# Patient Record
Sex: MALE | Race: WHITE | NOT HISPANIC OR LATINO | Employment: UNEMPLOYED | URBAN - NONMETROPOLITAN AREA
[De-identification: names, ages, dates, MRNs, and addresses within clinical notes are randomized per-mention and may not be internally consistent; named-entity substitution may affect disease eponyms.]

---

## 2017-01-03 RX ORDER — AZITHROMYCIN 500 MG/1
1000 TABLET, FILM COATED ORAL DAILY
Qty: 2 TABLET | Refills: 0 | Status: SHIPPED | OUTPATIENT
Start: 2017-01-03 | End: 2017-02-20

## 2017-01-03 NOTE — TELEPHONE ENCOUNTER
Pt called and stated that insurance would not cover doxy and a new order for zithromax 1000g.  Sent to pharmacy of choice

## 2017-02-20 ENCOUNTER — OFFICE VISIT (OUTPATIENT)
Dept: FAMILY MEDICINE CLINIC | Facility: CLINIC | Age: 27
End: 2017-02-20

## 2017-02-20 VITALS
WEIGHT: 162.8 LBS | BODY MASS INDEX: 21.57 KG/M2 | DIASTOLIC BLOOD PRESSURE: 60 MMHG | OXYGEN SATURATION: 98 % | HEART RATE: 83 BPM | HEIGHT: 73 IN | SYSTOLIC BLOOD PRESSURE: 118 MMHG

## 2017-02-20 DIAGNOSIS — Z13.9 SCREENING: ICD-10-CM

## 2017-02-20 DIAGNOSIS — A74.9 CHLAMYDIA: Primary | ICD-10-CM

## 2017-02-20 PROCEDURE — 99213 OFFICE O/P EST LOW 20 MIN: CPT | Performed by: INTERNAL MEDICINE

## 2017-02-20 RX ORDER — AZITHROMYCIN 250 MG/1
TABLET, FILM COATED ORAL
Qty: 6 TABLET | Refills: 0 | Status: SHIPPED | OUTPATIENT
Start: 2017-02-20 | End: 2017-08-01

## 2017-02-20 NOTE — PROGRESS NOTES
Subjective   Kris Lee is a 26 y.o. male.   Chief Complaint   Patient presents with   • Follow-up     having flare up       History of Present Illness co urinary discharge. Culture grew chylamadia last time. No fever.     The following portions of the patient's history were reviewed and updated as appropriate: allergies, current medications, past family history, past medical history, past social history, past surgical history and problem list.    Review of Systems   Constitutional: Negative for activity change, appetite change, fatigue and unexpected weight change.   HENT: Negative for ear pain, facial swelling and hearing loss.    Respiratory: Negative for cough, chest tightness, shortness of breath and wheezing.    Cardiovascular: Negative for chest pain, palpitations and leg swelling.   Gastrointestinal: Negative for abdominal pain.   Genitourinary: Positive for dysuria. Negative for difficulty urinating, flank pain, frequency and urgency.   Musculoskeletal: Negative for arthralgias and back pain.   Skin: Negative for color change and rash.   Allergic/Immunologic: Negative for environmental allergies and food allergies.   Neurological: Negative for dizziness, syncope, weakness, numbness and headaches.   Hematological: Negative for adenopathy.   Psychiatric/Behavioral: Negative for confusion, decreased concentration, dysphoric mood, sleep disturbance and suicidal ideas. The patient is not nervous/anxious.    All other systems reviewed and are negative.      Objective   Physical Exam   Constitutional: He is oriented to person, place, and time. Vital signs are normal. He appears well-developed and well-nourished.   HENT:   Head: Normocephalic.   Neck: No thyromegaly present.   Cardiovascular: Normal rate, regular rhythm and normal heart sounds.    No murmur heard.  Pulmonary/Chest: Effort normal and breath sounds normal. He has no wheezes. He has no rales.   Genitourinary: Discharge found.    Musculoskeletal: He exhibits no edema.   Lymphadenopathy:     He has no cervical adenopathy.   Neurological: He is alert and oriented to person, place, and time. No cranial nerve deficit.   Skin: Skin is warm and dry.   Psychiatric: He has a normal mood and affect. His behavior is normal. Judgment and thought content normal. His mood appears not anxious. He does not exhibit a depressed mood. He expresses no homicidal and no suicidal ideation.   Nursing note and vitals reviewed.      Assessment/Plan   Kris was seen today for follow-up.    Diagnoses and all orders for this visit:    Chlamydia      Azithromycin 500mg day 1, then 250mg q day days 2-5. Cbc,cmp, lipids, tfts. rtc in 1 week if not clear.

## 2017-02-24 ENCOUNTER — LAB (OUTPATIENT)
Dept: LAB | Facility: OTHER | Age: 27
End: 2017-02-24

## 2017-02-24 DIAGNOSIS — Z13.9 SCREENING: ICD-10-CM

## 2017-02-24 LAB
ALBUMIN SERPL-MCNC: 5 G/DL (ref 3.2–5.5)
ALBUMIN/GLOB SERPL: 1.7 G/DL (ref 1–3)
ALP SERPL-CCNC: 71 U/L (ref 15–121)
ALT SERPL W P-5'-P-CCNC: 16 U/L (ref 10–60)
ANION GAP SERPL CALCULATED.3IONS-SCNC: 8 MMOL/L (ref 5–15)
AST SERPL-CCNC: 26 U/L (ref 10–60)
BASOPHILS # BLD AUTO: 0.01 10*3/MM3 (ref 0–0.2)
BASOPHILS NFR BLD AUTO: 0.2 % (ref 0–2)
BILIRUB SERPL-MCNC: 1.2 MG/DL (ref 0.2–1)
BUN BLD-MCNC: 18 MG/DL (ref 8–25)
BUN/CREAT SERPL: 16.4 (ref 7–25)
CALCIUM SPEC-SCNC: 10.2 MG/DL (ref 8.4–10.8)
CHLORIDE SERPL-SCNC: 101 MMOL/L (ref 100–112)
CHOLEST SERPL-MCNC: 186 MG/DL (ref 150–200)
CO2 SERPL-SCNC: 30 MMOL/L (ref 20–32)
CREAT BLD-MCNC: 1.1 MG/DL (ref 0.4–1.3)
DEPRECATED RDW RBC AUTO: 41.3 FL (ref 35.1–43.9)
EOSINOPHIL # BLD AUTO: 0.08 10*3/MM3 (ref 0–0.7)
EOSINOPHIL NFR BLD AUTO: 1.7 % (ref 0–7)
ERYTHROCYTE [DISTWIDTH] IN BLOOD BY AUTOMATED COUNT: 12.2 % (ref 11.5–14.5)
GFR SERPL CREATININE-BSD FRML MDRD: 82 ML/MIN/1.73 (ref 77–179)
GLOBULIN UR ELPH-MCNC: 3 GM/DL (ref 2.5–4.6)
GLUCOSE BLD-MCNC: 110 MG/DL (ref 70–100)
HCT VFR BLD AUTO: 45.1 % (ref 39–49)
HDLC SERPL-MCNC: 40 MG/DL (ref 35–100)
HGB BLD-MCNC: 15.1 G/DL (ref 13.7–17.3)
LDLC SERPL CALC-MCNC: 129 MG/DL
LDLC/HDLC SERPL: 3.22 {RATIO}
LYMPHOCYTES # BLD AUTO: 1.73 10*3/MM3 (ref 0.6–4.2)
LYMPHOCYTES NFR BLD AUTO: 36 % (ref 10–50)
MCH RBC QN AUTO: 31.6 PG (ref 26.5–34)
MCHC RBC AUTO-ENTMCNC: 33.5 G/DL (ref 31.5–36.3)
MCV RBC AUTO: 94.4 FL (ref 80–98)
MONOCYTES # BLD AUTO: 0.47 10*3/MM3 (ref 0–0.9)
MONOCYTES NFR BLD AUTO: 9.8 % (ref 0–12)
NEUTROPHILS # BLD AUTO: 2.52 10*3/MM3 (ref 2–8.6)
NEUTROPHILS NFR BLD AUTO: 52.3 % (ref 37–80)
PLATELET # BLD AUTO: 153 10*3/MM3 (ref 150–450)
PMV BLD AUTO: 11.5 FL (ref 8–12)
POTASSIUM BLD-SCNC: 4 MMOL/L (ref 3.4–5.4)
PROT SERPL-MCNC: 8 G/DL (ref 6.7–8.2)
RBC # BLD AUTO: 4.78 10*6/MM3 (ref 4.37–5.74)
SODIUM BLD-SCNC: 139 MMOL/L (ref 134–146)
T4 SERPL-MCNC: 8.19 MCG/DL (ref 5.5–11)
TRIGL SERPL-MCNC: 86 MG/DL (ref 35–160)
TSH SERPL DL<=0.05 MIU/L-ACNC: 2.84 MIU/ML (ref 0.46–4.68)
VLDLC SERPL-MCNC: 17.2 MG/DL
WBC NRBC COR # BLD: 4.81 10*3/MM3 (ref 3.2–9.8)

## 2017-02-24 PROCEDURE — 80053 COMPREHEN METABOLIC PANEL: CPT | Performed by: INTERNAL MEDICINE

## 2017-02-24 PROCEDURE — 36415 COLL VENOUS BLD VENIPUNCTURE: CPT | Performed by: INTERNAL MEDICINE

## 2017-02-24 PROCEDURE — 85025 COMPLETE CBC W/AUTO DIFF WBC: CPT | Performed by: INTERNAL MEDICINE

## 2017-02-24 PROCEDURE — 84436 ASSAY OF TOTAL THYROXINE: CPT | Performed by: INTERNAL MEDICINE

## 2017-02-24 PROCEDURE — 84443 ASSAY THYROID STIM HORMONE: CPT | Performed by: INTERNAL MEDICINE

## 2017-02-24 PROCEDURE — 80061 LIPID PANEL: CPT | Performed by: INTERNAL MEDICINE

## 2017-03-28 RX ORDER — AZITHROMYCIN 500 MG/1
TABLET, FILM COATED ORAL
Qty: 2 TABLET | Refills: 0 | Status: SHIPPED | OUTPATIENT
Start: 2017-03-28 | End: 2017-08-01

## 2017-07-18 ENCOUNTER — OFFICE VISIT (OUTPATIENT)
Dept: ORTHOPEDIC SURGERY | Facility: CLINIC | Age: 27
End: 2017-07-18

## 2017-07-18 VITALS — WEIGHT: 162 LBS | HEIGHT: 73 IN | BODY MASS INDEX: 21.47 KG/M2

## 2017-07-18 DIAGNOSIS — M25.532 LEFT WRIST PAIN: ICD-10-CM

## 2017-07-18 DIAGNOSIS — M25.522 LEFT ELBOW PAIN: Primary | ICD-10-CM

## 2017-07-18 PROCEDURE — 99214 OFFICE O/P EST MOD 30 MIN: CPT | Performed by: NURSE PRACTITIONER

## 2017-07-18 RX ORDER — HYDROCODONE BITARTRATE AND ACETAMINOPHEN 7.5; 325 MG/1; MG/1
1 TABLET ORAL EVERY 6 HOURS PRN
COMMUNITY
End: 2017-08-01

## 2017-07-18 NOTE — PROGRESS NOTES
Kris Lee is a 27 y.o. male   Primary provider:  David Javier MD       Chief Complaint   Patient presents with   • Left Wrist - Pain   • Left Elbow - Pain       HISTORY OF PRESENT ILLNESS:    HPI Comments: Patient went to jump off the porch and got foot stuck on rail and fell. Was seen at [facility] on 7/16/2017 with x-rays done. Placed in sling and prescribed Norco.     Pain   This is a new problem. The current episode started in the past 7 days. The problem occurs constantly. The problem has been unchanged. Associated symptoms include joint swelling and neck pain. Associated symptoms comments: Sharp, popping, . Exacerbated by: moving arm.  He has tried oral narcotics (sling) for the symptoms. The treatment provided mild relief.        CONCURRENT MEDICAL HISTORY:    Past Medical History:   Diagnosis Date   • Abnormal liver function tests    • Cough    • Fever    • Infectious mononucleosis     Resolved   • Splenomegaly     Resolved   • Streptococcal sore throat        No Known Allergies      Current Outpatient Prescriptions:   •  HYDROcodone-acetaminophen (NORCO) 7.5-325 MG per tablet, Take 1 tablet by mouth Every 6 (Six) Hours As Needed for Moderate Pain ., Disp: , Rfl:   •  azithromycin (ZITHROMAX) 250 MG tablet, Take 2 tablets the first day, then 1 tablet daily for 4 days., Disp: 6 tablet, Rfl: 0  •  azithromycin (ZITHROMAX) 500 MG tablet, TAKE ONE TABLET BY MOUTH ONCE DAILY, Disp: 2 tablet, Rfl: 0    Past Surgical History:   Procedure Laterality Date   • INJECTION OF MEDICATION  08/11/2015    Bicillin LA 1.2 (1)       History reviewed. No pertinent family history.     Social History     Social History   • Marital status: Single     Spouse name: N/A   • Number of children: N/A   • Years of education: N/A     Occupational History   • Not on file.     Social History Main Topics   • Smoking status: Never Smoker   • Smokeless tobacco: Never Used   • Alcohol use No   • Drug use: Not on file   • Sexual  "activity: Defer     Other Topics Concern   • Not on file     Social History Narrative        Review of Systems   Musculoskeletal: Positive for joint swelling and neck pain.        Left elbow pain. Left wrist pain.    All other systems reviewed and are negative.      PHYSICAL EXAMINATION:       Ht 73\" (185.4 cm)  Wt 162 lb (73.5 kg)  BMI 21.37 kg/m2    Physical Exam   Constitutional: He is oriented to person, place, and time. Vital signs are normal. He appears well-developed and well-nourished.   HENT:   Head: Normocephalic.   Pulmonary/Chest: Effort normal. No respiratory distress.   Abdominal: Soft. He exhibits no distension.   Neurological: He is alert and oriented to person, place, and time. GCS eye subscore is 4. GCS verbal subscore is 5. GCS motor subscore is 6.   Skin: Skin is warm, dry and intact.   Psychiatric: He has a normal mood and affect. His speech is normal and behavior is normal. Judgment and thought content normal. Cognition and memory are normal.   Vitals reviewed.      GAIT:     [x]  Normal  []  Antalgic    Assistive device: [x]  None  []  Walker     []  Crutches  []  Cane     []  Wheelchair  []  Stretcher    Right Hand Exam   Right hand exam is normal.      Left Hand Exam     Tenderness   The patient is experiencing tenderness in the radial area and ulnar area.     Range of Motion     Wrist   Extension: abnormal   Flexion: abnormal     Muscle Strength   Wrist Extension: 5/5   Wrist Flexion: 5/5     Other   Erythema: absent  Scars: absent  Sensation: normal  Pulse: present    Comments:  Pain with flexion and extension.  not tested due to acute elbow injury and pain.       Right Elbow Exam   Right elbow exam is normal.      Left Elbow Exam     Tenderness   The patient is experiencing tenderness in the olecranon fossa.     Range of Motion   Extension: abnormal   Flexion: abnormal   Pronation: abnormal   Supination: abnormal     Muscle Strength   The patient has normal left elbow " strength.    Tests Varus: negative  Valgus: negative        Other   Erythema: absent  Scars: absent  Sensation: normal  Pulse: present    Comments:  Pain with passive, gentle ROM noted. Full assessment of ROM deferred due to suspected occult fracture in the elbow. Mild swelling present.               [facility]    Left forearm: 2 images of the left forearm are submitted for evaluation. No supracondylar or intercondylar humeral fracture is noted. The radial head and proximal ulna appear intact. No radial or ulnar shaft fracture is noted. The distal radius and ulna appear intact.     Impression: No acute left forearm abnormality observed.    Left elbow: 3 images of the left elbow demonstrate a left elbow joint effusion. This finding is associated with an occult fracture of the left elbow. The radial head and proximal ulna appear intact. No supracondylar or intercondylar fracture is noted.     Impression: Left elbow joint effusion which has a significant association with occult fracture of the elbow  No definite left elbow fracture noted.     Left wrist: 3 images of the left wrist are submitted for evaluation. The distal left radius and ulna appear intact. No carpal bone fracture is noted. The navicular bone and radiolunate articulation appear intact. No carpal or proximal metacarpal fracture is noted.     Impression: No acute left wrist abnormality observed.     Electronically signed on Jul 16, 2017 5:44:20 PM CDT (ET) by:   Eddie Jenkins M.D.   [phone]      ASSESSMENT:    Diagnoses and all orders for this visit:    Left elbow pain    Left wrist pain    Other orders  -     HYDROcodone-acetaminophen (NORCO) 7.5-325 MG per tablet; Take 1 tablet by mouth Every 6 (Six) Hours As Needed for Moderate Pain .      PLAN    X-ray images of left elbow and left wrist reviewed today that were done at [facility] on 7/16/2017.  No fracture is visible on x-rays, however there is a positive fat pad sign  and pain with range of motion that is consistent with fracture-type injury.  Plan to treat as a fracture at this time.  Recommend posterior long-arm fiberglass splint today.  Continue to wear sling to left arm.  Recommend ice therapy to left elbow intermittently 3-4 times daily for 20 minutes at a time.  Continue Norco as needed for pain, as previously prescribed.  Recommend to add oral NSAID therapy for pain, such as Ibuprofen or Naproxen.  Patient instructed he can take the NSAID with his Norco for severe pain or try taking the NSAID alone for milder pain.  Patient also complains of left wrist pain and swelling. X-rays are negative at this time, however the left wrist is immobilized with the posterior splint as well.  We will re-evaluate the left wrist at follow-up in 2 weeks and consider repeat left wrist x-rays for continued symptoms.  Follow-up in 2 weeks for recheck of left elbow and repeat x-rays at that time.    Return in about 2 weeks (around 8/1/2017) for Recheck.    SHANTI Heck

## 2017-08-01 ENCOUNTER — OFFICE VISIT (OUTPATIENT)
Dept: ORTHOPEDIC SURGERY | Facility: CLINIC | Age: 27
End: 2017-08-01

## 2017-08-01 VITALS — WEIGHT: 162 LBS | BODY MASS INDEX: 21.47 KG/M2 | HEIGHT: 73 IN

## 2017-08-01 DIAGNOSIS — M25.532 LEFT WRIST PAIN: ICD-10-CM

## 2017-08-01 DIAGNOSIS — M25.522 LEFT ELBOW PAIN: Primary | ICD-10-CM

## 2017-08-01 PROCEDURE — 99213 OFFICE O/P EST LOW 20 MIN: CPT | Performed by: NURSE PRACTITIONER

## 2017-08-01 RX ORDER — IBUPROFEN 800 MG/1
TABLET ORAL
COMMUNITY
Start: 2017-07-17 | End: 2017-10-27

## 2017-08-01 NOTE — PROGRESS NOTES
"Kris Lee is a 27 y.o. male returns for     Chief Complaint   Patient presents with   • Left Elbow - Follow-up   • Results     repeat xrays done today       HISTORY OF PRESENT ILLNESS: Presents to office for recheck of left elbow injury after falling from a porch on 7/16/2017. Patient is no longer having pain in the left elbow. Continues to have some pain in the left wrist. Posterior long arm fiberglass splint in place to left arm.         CONCURRENT MEDICAL HISTORY:    Past Medical History:   Diagnosis Date   • Abnormal liver function tests    • Cough    • Fever    • Infectious mononucleosis     Resolved   • Splenomegaly     Resolved   • Streptococcal sore throat        No Known Allergies      Current Outpatient Prescriptions:   •  ibuprofen (ADVIL,MOTRIN) 800 MG tablet, , Disp: , Rfl:     Past Surgical History:   Procedure Laterality Date   • INJECTION OF MEDICATION  08/11/2015    Bicillin LA 1.2 (1)       ROS  No fevers or chills.  No chest pain or shortness of air.  No GI or  disturbances. Left wrist pain.     PHYSICAL EXAMINATION:       Ht 73\" (185.4 cm)  Wt 162 lb (73.5 kg)  BMI 21.37 kg/m2    Physical Exam   Constitutional: He is oriented to person, place, and time. Vital signs are normal. He appears well-developed and well-nourished.   HENT:   Head: Normocephalic.   Pulmonary/Chest: Effort normal. No respiratory distress.   Abdominal: Soft. He exhibits no distension.   Neurological: He is alert and oriented to person, place, and time. GCS eye subscore is 4. GCS verbal subscore is 5. GCS motor subscore is 6.   Skin: Skin is warm, dry and intact.   Psychiatric: He has a normal mood and affect. His speech is normal and behavior is normal. Judgment and thought content normal. Cognition and memory are normal.   Vitals reviewed.      GAIT:     [x]  Normal  []  Antalgic    Assistive device: [x]  None  []  Walker     []  Crutches  []  Cane     []  Wheelchair  []  Stretcher    Right Elbow Exam   Right " elbow exam is normal.      Left Elbow Exam     Tenderness   The patient is experiencing no tenderness.         Range of Motion   Extension:  20 abnormal   Flexion: normal   Pronation: normal   Supination: normal     Muscle Strength   Pronation:  4/5   Supination:  4/5     Tests Varus: negative  Valgus: negative        Other   Erythema: absent  Scars: absent  Sensation: normal  Pulse: present    Comments:  Healing ecchymosis (yellow) noted to medial aspect of left elbow and left upper arm. Difficulty with full extension. No swelling.             Xr Elbow 3+ View Left    Result Date: 8/1/2017  Narrative: 3 views of the left elbow reveal no evidence of fracture.  No evidence of dislocation.  There is no positive fat pad sign today.  No acute radiologic abnormalities are noted.08/01/17 at 2:56 PM by SHANTI Heck     Xr Wrist 3+ View Left    Result Date: 8/1/2017  Narrative: 3 views of the left wrist reveal no evidence of fracture.  No acute radiologic abnormalities are noted.08/01/17 at 2:56 PM by SHANTI Heck     [facility]     Left forearm: 2 images of the left forearm are submitted for evaluation. No supracondylar or intercondylar humeral fracture is noted. The radial head and proximal ulna appear intact. No radial or ulnar shaft fracture is noted. The distal radius and ulna appear intact.      Impression: No acute left forearm abnormality observed.     Left elbow: 3 images of the left elbow demonstrate a left elbow joint effusion. This finding is associated with an occult fracture of the left elbow. The radial head and proximal ulna appear intact. No supracondylar or intercondylar fracture is noted.      Impression: Left elbow joint effusion which has a significant association with occult fracture of the elbow  No definite left elbow fracture noted.      Left wrist: 3 images of the left wrist are submitted for evaluation. The distal left radius and ulna appear intact. No carpal bone  "fracture is noted. The navicular bone and radiolunate articulation appear intact. No carpal or proximal metacarpal fracture is noted.      Impression: No acute left wrist abnormality observed.      Electronically signed on Jul 16, 2017 5:44:20 PM CDT (ET) by:   Eddie Jenkins M.D.   [phone]    ASSESSMENT:    Diagnoses and all orders for this visit:    Left elbow pain  -     Ambulatory Referral to Physical Therapy Evaluate and treat    Left wrist pain  -     XR Wrist 3+ View Left  -     Ambulatory Referral to Physical Therapy Evaluate and treat  -     Ibuprofen (ADVIL,MOTRIN) 800 MG tablet;     PLAN    X-rays of left elbow and left wrist reviewed today.  Pain in left elbow has resolved but the patient continues to have some mild left wrist pain intermittently, primarily to the lateral aspect of the left wrist.  Posterior fiberglass splint removed from left arm today.  Patient has no pain with range of motion of elbow, but states his elbow feels \"stiff\" and does not yet have full extension on exam today.  Recommend physical therapy.  Patient is in agreement with this and order will be placed today.  Recommend velcro wrist control splint to the left wrist.  Patient states that he has one of these at home and declines one to be placed today.  Continue Ibuprofen 800 mg every 8 hours as needed for pain.  Continue intermittent ice therapy to left elbow and left wrist for 20 minutes at a time as needed for pain.  Recommend gradual progression of left arm use, based on pain.  Follow-up in 6 weeks for recheck.     Return in about 6 weeks (around 9/12/2017) for Recheck.    SHANTI Heck  "

## 2017-08-07 ENCOUNTER — CONSULT (OUTPATIENT)
Dept: PHYSICAL THERAPY | Facility: CLINIC | Age: 27
End: 2017-08-07

## 2017-08-07 DIAGNOSIS — M25.522 LEFT ELBOW PAIN: ICD-10-CM

## 2017-08-07 DIAGNOSIS — M25.532 LEFT WRIST PAIN: Primary | ICD-10-CM

## 2017-08-07 PROCEDURE — 97161 PT EVAL LOW COMPLEX 20 MIN: CPT | Performed by: PHYSICAL THERAPIST

## 2017-08-07 PROCEDURE — 97110 THERAPEUTIC EXERCISES: CPT | Performed by: PHYSICAL THERAPIST

## 2017-08-07 NOTE — PROGRESS NOTES
Outpatient Physical Therapy Ortho Initial Evaluation       Patient Name: Kris Lee  : 1990  MRN: [id number]  Today's Date: 2017      Visit Date: 2017  Visit   Return to MD: [facility]I  Re-cert date: 17    TIME IN 1400    TIME OUT 1455    Patient Active Problem List   Diagnosis   • Left wrist pain   • Left elbow pain        Past Medical History:   Diagnosis Date   • Abnormal liver function tests    • Cough    • Fever    • Infectious mononucleosis     Resolved   • Splenomegaly     Resolved   • Streptococcal sore throat         Past Surgical History:   Procedure Laterality Date   • INJECTION OF MEDICATION  2015    Bicillin LA 1.2 (1)       Visit Dx:     ICD-10-CM ICD-9-CM   1. Left wrist pain M25.532 719.43   2. Left elbow pain M25.522 719.42     Outpatient Medications     albuterol (PROVENTIL HFA;VENTOLIN HFA) 108 (90 BASE) MCG/ACT inhaler      amLODIPine (NORVASC) 10 MG tablet      aspirin 81 MG EC tablet      cetirizine (zyrTEC) 10 MG tablet      cholecalciferol (VITAMIN D3) 400 UNITS tablet      citalopram (CeleXA) 10 MG tablet      CloNIDine (CATAPRES) 0.1 MG tablet      clopidogrel (PLAVIX) 75 MG tablet      Docusate Sodium 100 MG capsule      famotidine (PEPCID) 20 MG tablet      furosemide (LASIX) 20 MG tablet      glipiZIDE (GLUCOTROL) 5 MG tablet      isosorbide mononitrate (IMDUR) 60 MG 24 hr tablet      lisinopril (PRINIVIL,ZESTRIL) 40 MG tablet      lovastatin (MEVACOR) 40 MG tablet      magnesium oxide (MAGOX) 400 (241.3 MG) MG tablet tablet      metoprolol tartrate (LOPRESSOR) 50 MG tablet      Allergies: NKA    Subjective Evaluation    History of Present Illness  Mechanism of injury: 26 yo male with a left elbow fracture in 2017 falling directly on his elbow one day while playing basketball. Casted for 4 weeks and 3 days ago had the cast removed. Overall, pt reports mostly stiffness, with referring provider saying that is normal as he was postured in flexed  position while casted.    Quality of life: excellent    Pain  Current pain rating: 3  Location: left medial elbow  Quality: dull ache  Relieving factors: ice and support  Exacerbated by: overstretching left elbow.  Progression: improved    Social Support  Lives in: one-story house  Lives with: parents    Treatments  Current treatment: medication and physical therapy  Patient Goals  Patient goals for therapy: increased motion, increased strength and return to sport/leisure activities  Patient goal:   STG's (in 3 weeks)  1. Pt I with HEP.  2. Improve left elbow extension AROM to 0 degrees.  3. Improve left elbow flex/ext MMT to 5/5  4. Reduce left elbow pain to 0/10 with performing daily tasks.  5. Return to full duty work without restrictions.                RUE AROM (degrees)  R Elbow Flexion/Extension 0-135-150:  (0-148 deg)     RUE Strength  RUE Overall Strength: Within Functional Limits - strength 5/5        LUE AROM (degrees)  L Elbow Flexion/Extension 0-135-150:  (- deg)     LUE Strength  L Shoulder Flexion: 5/5  L Shoulder ABduction: 5/5  L Elbow Flexion: 4+/5  L Elbow Extension: 4/5  L Forearm Pronation: 5/5  L Forearm Supination: 5/5  L Wrist Flexion: 5/5  L Wrist Extension: 5/5  L Wrist Radial Deviation: 5/5  L Wrist Ulnar Deviation: 5/5                     Body Part  Body Part: Elbow                     Right Elbow Observations  R Elbow Tenderness: TTP along right distal humerus region  Elbow Special Tests  Other:  (deferred special tests due to recent left elbow fracture.)                        Exercises       08/07/17 1415          Exercise 1    Exercise Name 1 seated left elbow ext w/ self OP  -BS      Exercise 2    Exercise Name 2 prayer stretch  -BS      Exercise 3    Exercise Name 3 left elbow flex stretch w/ self OP  -BS      Exercise 4    Exercise Name 4 L resisted biceps curls  -BS      Exercise 5    Exercise Name 5 L resisted triceps  -BS        User Key  (r) = Recorded By, (t) = Taken By,  (c) = Cosigned By    Initials Name Provider Type    BS Jeffrey Lemus, PT Physical Therapist           Therapeutic exercise 18986: 15 minutes  Cold pack x 10 min to left elbow                 Assessment & Plan     Assessment  Impairments: abnormal or restricted ROM, activity intolerance, impaired physical strength, lacks appropriate home exercise program and pain with function  Other impairment: TTP along right medial distal humerus region, intact light touch sensation with B UE's  Assessment details: 28 yo male with acute left elbow pain s/p left elbow fracture. Patient presents with limited left elbow ext AROM, left elbow triceps > left biceps weakness, left elbow pain and stiffness and limited ability to perform job duties.   Prognosis: good    Goals    STG's (in 3 weeks)  1. Pt I with HEP.  2. Improve left elbow extension AROM to 0 degrees.  3. Improve left elbow flex/ext MMT to 5/5  4. Reduce left elbow pain to 0/10 with performing daily tasks.  5. Return to full duty work without restrictions.    Plan  Therapy options: will be seen for skilled physical therapy services  Planned modality interventions: cryotherapy, thermotherapy (hydrocollator packs) and ultrasound  Planned therapy interventions: flexibility, functional ROM exercises, home exercise program, joint mobilization, manual therapy, soft tissue mobilization, spinal/joint mobilization, strengthening, stretching and therapeutic activities  Frequency: 2x week  Duration in weeks: 3  Treatment plan discussed with: patient  Plan details: F/u with aggressive stretching to facilitate full extension of the left elbow, progressive resistive ex's to left elbow (biceps/triceps).  Functional Limitations: carrying objects, lifting and pushing      Time Calculation: 55      Quick Dash score 20.45%                Jeffrey Lemus, PT  8/7/2017        Kris Lee    1990

## 2017-08-09 ENCOUNTER — LAB (OUTPATIENT)
Dept: LAB | Facility: OTHER | Age: 27
End: 2017-08-09

## 2017-08-09 ENCOUNTER — OFFICE VISIT (OUTPATIENT)
Dept: FAMILY MEDICINE CLINIC | Facility: CLINIC | Age: 27
End: 2017-08-09

## 2017-08-09 VITALS
TEMPERATURE: 97.6 F | HEART RATE: 96 BPM | WEIGHT: 159 LBS | SYSTOLIC BLOOD PRESSURE: 124 MMHG | OXYGEN SATURATION: 98 % | HEIGHT: 73 IN | DIASTOLIC BLOOD PRESSURE: 68 MMHG | BODY MASS INDEX: 21.07 KG/M2

## 2017-08-09 DIAGNOSIS — A74.9 CHLAMYDIA: ICD-10-CM

## 2017-08-09 DIAGNOSIS — Z13.9 SCREENING: ICD-10-CM

## 2017-08-09 DIAGNOSIS — Z20.2 SEXUALLY TRANSMITTED DISEASE EXPOSURE: Primary | ICD-10-CM

## 2017-08-09 DIAGNOSIS — Z20.2 SEXUALLY TRANSMITTED DISEASE EXPOSURE: ICD-10-CM

## 2017-08-09 DIAGNOSIS — R30.0 DYSURIA: ICD-10-CM

## 2017-08-09 LAB
BILIRUB UR QL STRIP: NEGATIVE
CLARITY UR: CLEAR
COLOR UR: YELLOW
GLUCOSE UR STRIP-MCNC: NEGATIVE MG/DL
HGB UR QL STRIP.AUTO: NEGATIVE
KETONES UR QL STRIP: NEGATIVE
LEUKOCYTE ESTERASE UR QL STRIP.AUTO: NEGATIVE
NITRITE UR QL STRIP: NEGATIVE
PH UR STRIP.AUTO: 5.5 [PH] (ref 5.5–8)
PROT UR QL STRIP: NEGATIVE
SP GR UR STRIP: 1.02 (ref 1–1.03)
UROBILINOGEN UR QL STRIP: NORMAL

## 2017-08-09 PROCEDURE — 86695 HERPES SIMPLEX TYPE 1 TEST: CPT | Performed by: NURSE PRACTITIONER

## 2017-08-09 PROCEDURE — G0432 EIA HIV-1/HIV-2 SCREEN: HCPCS | Performed by: NURSE PRACTITIONER

## 2017-08-09 PROCEDURE — 81003 URINALYSIS AUTO W/O SCOPE: CPT | Performed by: NURSE PRACTITIONER

## 2017-08-09 PROCEDURE — 96372 THER/PROPH/DIAG INJ SC/IM: CPT | Performed by: NURSE PRACTITIONER

## 2017-08-09 PROCEDURE — 86696 HERPES SIMPLEX TYPE 2 TEST: CPT | Performed by: NURSE PRACTITIONER

## 2017-08-09 PROCEDURE — 87491 CHLMYD TRACH DNA AMP PROBE: CPT | Performed by: NURSE PRACTITIONER

## 2017-08-09 PROCEDURE — 87591 N.GONORRHOEAE DNA AMP PROB: CPT | Performed by: NURSE PRACTITIONER

## 2017-08-09 PROCEDURE — 99213 OFFICE O/P EST LOW 20 MIN: CPT | Performed by: NURSE PRACTITIONER

## 2017-08-09 PROCEDURE — 86592 SYPHILIS TEST NON-TREP QUAL: CPT | Performed by: NURSE PRACTITIONER

## 2017-08-09 PROCEDURE — 80074 ACUTE HEPATITIS PANEL: CPT | Performed by: NURSE PRACTITIONER

## 2017-08-09 RX ORDER — AZITHROMYCIN 500 MG/1
TABLET, FILM COATED ORAL
Qty: 4 TABLET | Refills: 0 | Status: SHIPPED | OUTPATIENT
Start: 2017-08-09 | End: 2017-10-27

## 2017-08-09 RX ORDER — METRONIDAZOLE 500 MG/1
TABLET ORAL
Qty: 16 TABLET | Refills: 0 | Status: SHIPPED | OUTPATIENT
Start: 2017-08-09 | End: 2017-10-27

## 2017-08-09 RX ORDER — CEFTRIAXONE 1 G/1
1 INJECTION, POWDER, FOR SOLUTION INTRAMUSCULAR; INTRAVENOUS ONCE
Status: COMPLETED | OUTPATIENT
Start: 2017-08-09 | End: 2017-08-09

## 2017-08-09 RX ADMIN — CEFTRIAXONE 1 G: 1 INJECTION, POWDER, FOR SOLUTION INTRAMUSCULAR; INTRAVENOUS at 10:09

## 2017-08-09 NOTE — PROGRESS NOTES
Subjective   Kris Lee is a 27 y.o. male who presents to the office complaining of a cloudy substance coming from his penis as well as itching at the tip.  Reports occasional dysuria.  Has been seen by PCP Concepcion for POS Chlamydia and received treatment over the past several months.  Tells me he tolerated that treatment with no adverse effects.  Has continued a sexual relationship with the same female, denies using condoms.    History of Present Illness     The following portions of the patient's history were reviewed and updated as appropriate: allergies, current medications, past family history, past medical history, past social history, past surgical history and problem list.    Review of Systems   Constitutional: Negative for chills, fatigue and fever.   HENT: Negative for congestion, sneezing, sore throat and trouble swallowing.    Eyes: Negative for visual disturbance.   Respiratory: Negative for cough, chest tightness, shortness of breath and wheezing.    Cardiovascular: Negative for chest pain, palpitations and leg swelling.   Gastrointestinal: Negative for abdominal pain, constipation, diarrhea, nausea and vomiting.   Genitourinary: Positive for discharge and dysuria. Negative for frequency and urgency.   Musculoskeletal: Negative for neck pain.   Skin: Negative for rash.   Neurological: Negative for dizziness, weakness and headaches.   Psychiatric/Behavioral:        In the past two weeks the pt has not felt down, depressed, hopeless or lost interest in doing things   All other systems reviewed and are negative.    Objective   Physical Exam   Constitutional: He is oriented to person, place, and time. He appears well-developed and well-nourished. He is cooperative. He does not have a sickly appearance. He does not appear ill.   HENT:   Head: Normocephalic and atraumatic.   Right Ear: Tympanic membrane and external ear normal.   Left Ear: Tympanic membrane and external ear normal.   Nose: Nose  normal.   Mouth/Throat: Uvula is midline, oropharynx is clear and moist and mucous membranes are normal.   Eyes: Conjunctivae, EOM and lids are normal. Pupils are equal, round, and reactive to light. Right eye exhibits no discharge. Left eye exhibits no discharge. No scleral icterus.   Neck: Trachea normal, normal range of motion and phonation normal. Neck supple. No thyromegaly present.   Cardiovascular: Normal rate, regular rhythm, normal heart sounds and intact distal pulses.  Exam reveals no gallop and no friction rub.    No murmur heard.  Pulmonary/Chest: Effort normal and breath sounds normal. No respiratory distress. He has no wheezes. He has no rales.   Abdominal: Soft. Normal appearance and bowel sounds are normal. He exhibits no distension. There is no tenderness. There is no rebound and no guarding.   Musculoskeletal: Normal range of motion. He exhibits no edema.   Lymphadenopathy:     He has no cervical adenopathy.   Neurological: He is alert and oriented to person, place, and time. No cranial nerve deficit. GCS eye subscore is 4. GCS verbal subscore is 5. GCS motor subscore is 6.   Skin: Skin is warm, dry and intact. No rash noted.   Psychiatric: He has a normal mood and affect. His behavior is normal. Judgment and thought content normal.   Nursing note and vitals reviewed.    Assessment/Plan   Kris was seen today for follow-up.    Diagnoses and all orders for this visit:    Sexually transmitted disease exposure  -     Urinalysis With / Microscopic If Indicated; Future  -     C.trachomatis/N. gonorrhoeae PCR Urine; Future  -     Chlamydia Trachomatis, Neisseria Gonorrhoeae, Trichomonas Vaginalis, BEN; Future  -     RPR; Future  -     HSV 1 & 2 IgM, Antibodies, Indirect; Future  -     HSV 1 & 2 - Specific Antibody, IgG; Future  -     cefTRIAXone (ROCEPHIN) injection 1 g; Inject 1 g into the shoulder, thigh, or buttocks 1 (One) Time.    Chlamydia  -     Urinalysis With / Microscopic If Indicated;  Future  -     C.trachomatis/N. gonorrhoeae PCR Urine; Future  -     Chlamydia Trachomatis, Neisseria Gonorrhoeae, Trichomonas Vaginalis, BEN; Future  -     RPR; Future  -     HSV 1 & 2 IgM, Antibodies, Indirect; Future  -     HSV 1 & 2 - Specific Antibody, IgG; Future  -     cefTRIAXone (ROCEPHIN) injection 1 g; Inject 1 g into the shoulder, thigh, or buttocks 1 (One) Time.    Screening  -     Urinalysis With / Microscopic If Indicated; Future  -     C.trachomatis/N. gonorrhoeae PCR Urine; Future  -     Chlamydia Trachomatis, Neisseria Gonorrhoeae, Trichomonas Vaginalis, BEN; Future  -     RPR; Future  -     HSV 1 & 2 IgM, Antibodies, Indirect; Future  -     HSV 1 & 2 - Specific Antibody, IgG; Future    Dysuria  -     Urinalysis With / Microscopic If Indicated; Future  -     C.trachomatis/N. gonorrhoeae PCR Urine; Future  -     Chlamydia Trachomatis, Neisseria Gonorrhoeae, Trichomonas Vaginalis, BEN; Future  -     RPR; Future  -     HSV 1 & 2 IgM, Antibodies, Indirect; Future  -     HSV 1 & 2 - Specific Antibody, IgG; Future    Other orders  -     metroNIDAZOLE (FLAGYL) 500 MG tablet; Take four tablets by mouth today and then one tablet twice daily x 7 days  -     azithromycin (ZITHROMAX) 500 MG tablet; Take four tablets by mouth today.

## 2017-08-11 LAB
C TRACH RRNA CVX QL NAA+PROBE: NOT DETECTED
HAV IGM SERPL QL IA: NEGATIVE
HBV CORE IGM SERPL QL IA: NEGATIVE
HBV SURFACE AG SERPL QL IA: NEGATIVE
HCV AB SER DONR QL: NEGATIVE
HIV1+2 AB SER QL: NEGATIVE
N GONORRHOEA RRNA SPEC QL NAA+PROBE: NOT DETECTED

## 2017-08-14 LAB
HSV1 IGG SER IA-ACNC: <0.91 INDEX (ref 0–0.9)
HSV1 IGM TITR SER IF: NORMAL TITER
HSV2 IGG SER IA-ACNC: <0.91 INDEX (ref 0–0.9)
HSV2 IGM TITR SER IF: NORMAL TITER

## 2017-08-14 NOTE — PATIENT INSTRUCTIONS
Is encouraged to take meds as directed.  Should not have sexual intercourse or drink ETOH while on Flagyl.  Practice safe sexual practices by using condoms.

## 2017-08-15 ENCOUNTER — TELEPHONE (OUTPATIENT)
Dept: FAMILY MEDICINE CLINIC | Facility: CLINIC | Age: 27
End: 2017-08-15

## 2017-08-15 LAB — RPR SER QL: NORMAL

## 2017-08-15 NOTE — TELEPHONE ENCOUNTER
Pt returned call to office and informed that labs were normal.  Pt stated what does that mean?  This nurse told pt that he was negative for STD screening.  Pt then voiced understanding and thanked for call back

## 2017-08-15 NOTE — TELEPHONE ENCOUNTER
----- Message from SHANTI Holguin sent at 8/14/2017  3:57 PM CDT -----  Please inform these labs are NEG.

## 2017-10-27 ENCOUNTER — OFFICE VISIT (OUTPATIENT)
Dept: FAMILY MEDICINE CLINIC | Facility: CLINIC | Age: 27
End: 2017-10-27

## 2017-10-27 VITALS
TEMPERATURE: 98.1 F | DIASTOLIC BLOOD PRESSURE: 86 MMHG | WEIGHT: 156 LBS | BODY MASS INDEX: 20.67 KG/M2 | OXYGEN SATURATION: 98 % | HEIGHT: 73 IN | HEART RATE: 72 BPM | SYSTOLIC BLOOD PRESSURE: 120 MMHG

## 2017-10-27 DIAGNOSIS — Z02.5 SPORTS PHYSICAL: ICD-10-CM

## 2017-10-27 DIAGNOSIS — Z00.00 ENCOUNTER FOR WELLNESS EXAMINATION IN ADULT: Primary | ICD-10-CM

## 2017-10-27 PROCEDURE — 99395 PREV VISIT EST AGE 18-39: CPT | Performed by: NURSE PRACTITIONER

## 2017-12-04 ENCOUNTER — OFFICE VISIT (OUTPATIENT)
Dept: FAMILY MEDICINE CLINIC | Facility: CLINIC | Age: 27
End: 2017-12-04

## 2017-12-04 ENCOUNTER — LAB (OUTPATIENT)
Dept: LAB | Facility: OTHER | Age: 27
End: 2017-12-04

## 2017-12-04 VITALS
OXYGEN SATURATION: 100 % | BODY MASS INDEX: 21.2 KG/M2 | HEART RATE: 56 BPM | SYSTOLIC BLOOD PRESSURE: 112 MMHG | HEIGHT: 73 IN | TEMPERATURE: 98.6 F | DIASTOLIC BLOOD PRESSURE: 76 MMHG | WEIGHT: 160 LBS

## 2017-12-04 DIAGNOSIS — Z00.00 ROUTINE GENERAL MEDICAL EXAMINATION AT A HEALTH CARE FACILITY: ICD-10-CM

## 2017-12-04 DIAGNOSIS — Z20.2 SEXUALLY TRANSMITTED DISEASE EXPOSURE: ICD-10-CM

## 2017-12-04 DIAGNOSIS — Z20.2 SEXUALLY TRANSMITTED DISEASE EXPOSURE: Primary | ICD-10-CM

## 2017-12-04 LAB
ALBUMIN SERPL-MCNC: 5.2 G/DL (ref 3.2–5.5)
ALBUMIN/GLOB SERPL: 1.7 G/DL (ref 1–3)
ALP SERPL-CCNC: 56 U/L (ref 15–121)
ALT SERPL W P-5'-P-CCNC: 10 U/L (ref 10–60)
ANION GAP SERPL CALCULATED.3IONS-SCNC: 10 MMOL/L (ref 5–15)
AST SERPL-CCNC: 22 U/L (ref 10–60)
BACTERIA UR QL AUTO: ABNORMAL /HPF
BASOPHILS # BLD AUTO: 0.01 10*3/MM3 (ref 0–0.2)
BASOPHILS NFR BLD AUTO: 0.2 % (ref 0–2)
BILIRUB SERPL-MCNC: 1 MG/DL (ref 0.2–1)
BILIRUB UR QL STRIP: NEGATIVE
BUN BLD-MCNC: 13 MG/DL (ref 8–25)
BUN/CREAT SERPL: 11.8 (ref 7–25)
CALCIUM SPEC-SCNC: 10.1 MG/DL (ref 8.4–10.8)
CHLORIDE SERPL-SCNC: 101 MMOL/L (ref 100–112)
CHOLEST SERPL-MCNC: 190 MG/DL (ref 150–200)
CLARITY UR: CLEAR
CO2 SERPL-SCNC: 30 MMOL/L (ref 20–32)
COLOR UR: YELLOW
CREAT BLD-MCNC: 1.1 MG/DL (ref 0.4–1.3)
DEPRECATED RDW RBC AUTO: 41.3 FL (ref 35.1–43.9)
EOSINOPHIL # BLD AUTO: 0.07 10*3/MM3 (ref 0–0.7)
EOSINOPHIL NFR BLD AUTO: 1.4 % (ref 0–7)
ERYTHROCYTE [DISTWIDTH] IN BLOOD BY AUTOMATED COUNT: 12.1 % (ref 11.5–14.5)
GFR SERPL CREATININE-BSD FRML MDRD: 82 ML/MIN/1.73 (ref 77–179)
GLOBULIN UR ELPH-MCNC: 3 GM/DL (ref 2.5–4.6)
GLUCOSE BLD-MCNC: 99 MG/DL (ref 70–100)
GLUCOSE UR STRIP-MCNC: NEGATIVE MG/DL
HCT VFR BLD AUTO: 44.5 % (ref 39–49)
HDLC SERPL-MCNC: 38 MG/DL (ref 35–100)
HGB BLD-MCNC: 14.7 G/DL (ref 13.7–17.3)
HGB UR QL STRIP.AUTO: NEGATIVE
HYALINE CASTS UR QL AUTO: ABNORMAL /LPF
KETONES UR QL STRIP: NEGATIVE
LDLC SERPL CALC-MCNC: 134 MG/DL
LDLC/HDLC SERPL: 3.53 {RATIO}
LEUKOCYTE ESTERASE UR QL STRIP.AUTO: NEGATIVE
LYMPHOCYTES # BLD AUTO: 1.45 10*3/MM3 (ref 0.6–4.2)
LYMPHOCYTES NFR BLD AUTO: 29.2 % (ref 10–50)
MCH RBC QN AUTO: 31.5 PG (ref 26.5–34)
MCHC RBC AUTO-ENTMCNC: 33 G/DL (ref 31.5–36.3)
MCV RBC AUTO: 95.3 FL (ref 80–98)
MONOCYTES # BLD AUTO: 0.48 10*3/MM3 (ref 0–0.9)
MONOCYTES NFR BLD AUTO: 9.7 % (ref 0–12)
NEUTROPHILS # BLD AUTO: 2.95 10*3/MM3 (ref 2–8.6)
NEUTROPHILS NFR BLD AUTO: 59.5 % (ref 37–80)
NITRITE UR QL STRIP: NEGATIVE
PH UR STRIP.AUTO: 7 [PH] (ref 5.5–8)
PLATELET # BLD AUTO: 151 10*3/MM3 (ref 150–450)
PMV BLD AUTO: 11.4 FL (ref 8–12)
POTASSIUM BLD-SCNC: 4.3 MMOL/L (ref 3.4–5.4)
PROT SERPL-MCNC: 8.2 G/DL (ref 6.7–8.2)
PROT UR QL STRIP: NEGATIVE
RBC # BLD AUTO: 4.67 10*6/MM3 (ref 4.37–5.74)
RBC # UR: ABNORMAL /HPF
REF LAB TEST METHOD: ABNORMAL
SODIUM BLD-SCNC: 141 MMOL/L (ref 134–146)
SP GR UR STRIP: 1.02 (ref 1–1.03)
SQUAMOUS #/AREA URNS HPF: ABNORMAL /HPF
TRIGL SERPL-MCNC: 89 MG/DL (ref 35–160)
UROBILINOGEN UR QL STRIP: NORMAL
VLDLC SERPL-MCNC: 17.8 MG/DL
WBC NRBC COR # BLD: 4.96 10*3/MM3 (ref 3.2–9.8)
WBC UR QL AUTO: ABNORMAL /HPF

## 2017-12-04 PROCEDURE — 87491 CHLMYD TRACH DNA AMP PROBE: CPT | Performed by: INTERNAL MEDICINE

## 2017-12-04 PROCEDURE — 87086 URINE CULTURE/COLONY COUNT: CPT | Performed by: INTERNAL MEDICINE

## 2017-12-04 PROCEDURE — 86696 HERPES SIMPLEX TYPE 2 TEST: CPT | Performed by: INTERNAL MEDICINE

## 2017-12-04 PROCEDURE — 80053 COMPREHEN METABOLIC PANEL: CPT | Performed by: INTERNAL MEDICINE

## 2017-12-04 PROCEDURE — 85025 COMPLETE CBC W/AUTO DIFF WBC: CPT | Performed by: INTERNAL MEDICINE

## 2017-12-04 PROCEDURE — 81001 URINALYSIS AUTO W/SCOPE: CPT | Performed by: INTERNAL MEDICINE

## 2017-12-04 PROCEDURE — 99213 OFFICE O/P EST LOW 20 MIN: CPT | Performed by: INTERNAL MEDICINE

## 2017-12-04 PROCEDURE — 87591 N.GONORRHOEAE DNA AMP PROB: CPT | Performed by: INTERNAL MEDICINE

## 2017-12-04 PROCEDURE — 87661 TRICHOMONAS VAGINALIS AMPLIF: CPT | Performed by: INTERNAL MEDICINE

## 2017-12-04 PROCEDURE — 86695 HERPES SIMPLEX TYPE 1 TEST: CPT | Performed by: INTERNAL MEDICINE

## 2017-12-04 PROCEDURE — 84436 ASSAY OF TOTAL THYROXINE: CPT | Performed by: INTERNAL MEDICINE

## 2017-12-04 PROCEDURE — 80061 LIPID PANEL: CPT | Performed by: INTERNAL MEDICINE

## 2017-12-04 PROCEDURE — 84443 ASSAY THYROID STIM HORMONE: CPT | Performed by: INTERNAL MEDICINE

## 2017-12-05 LAB
T4 SERPL-MCNC: 9.09 MCG/DL (ref 5.5–11)
TSH SERPL DL<=0.05 MIU/L-ACNC: 3.77 MIU/ML (ref 0.46–4.68)

## 2017-12-06 LAB
BACTERIA SPEC AEROBE CULT: NORMAL
C TRACH RRNA CVX QL NAA+PROBE: NEGATIVE
HSV1 IGG SER IA-ACNC: <0.91 INDEX (ref 0–0.9)
HSV2 IGG SER IA-ACNC: <0.91 INDEX (ref 0–0.9)
N GONORRHOEA RRNA SPEC QL NAA+PROBE: NEGATIVE
T VAGINALIS DNA VAG QL PROBE+SIG AMP: NORMAL

## 2017-12-11 ENCOUNTER — OFFICE VISIT (OUTPATIENT)
Dept: FAMILY MEDICINE CLINIC | Facility: CLINIC | Age: 27
End: 2017-12-11

## 2017-12-11 VITALS
DIASTOLIC BLOOD PRESSURE: 60 MMHG | SYSTOLIC BLOOD PRESSURE: 110 MMHG | OXYGEN SATURATION: 99 % | HEART RATE: 51 BPM | WEIGHT: 161 LBS | HEIGHT: 73 IN | BODY MASS INDEX: 21.34 KG/M2

## 2017-12-11 DIAGNOSIS — Z20.2 SEXUALLY TRANSMITTED DISEASE EXPOSURE: Primary | ICD-10-CM

## 2017-12-11 PROCEDURE — 99213 OFFICE O/P EST LOW 20 MIN: CPT | Performed by: INTERNAL MEDICINE

## 2018-01-18 ENCOUNTER — DOCUMENTATION (OUTPATIENT)
Dept: PHYSICAL THERAPY | Facility: CLINIC | Age: 28
End: 2018-01-18

## 2018-01-18 NOTE — PROGRESS NOTES
Discharge Summary  Discharge Summary from Physical Therapy Report      Date of eval: 8-7-17  Number of Visits: 1/3     Discharge Status of Patient: No recorded status change. Pt did not attend any f/u visits post eval.     Goals: Not Met    Prognosis: Fair    Comments: D/C secondary to non compliance.     Date of Discharge: 1-18-18        Ramesh Whitney, BALJEET  Physical Therapist

## 2018-04-04 ENCOUNTER — LAB (OUTPATIENT)
Dept: LAB | Facility: OTHER | Age: 28
End: 2018-04-04

## 2018-04-04 ENCOUNTER — OFFICE VISIT (OUTPATIENT)
Dept: FAMILY MEDICINE CLINIC | Facility: CLINIC | Age: 28
End: 2018-04-04

## 2018-04-04 VITALS
SYSTOLIC BLOOD PRESSURE: 124 MMHG | TEMPERATURE: 99.4 F | HEART RATE: 81 BPM | OXYGEN SATURATION: 98 % | WEIGHT: 164 LBS | DIASTOLIC BLOOD PRESSURE: 80 MMHG | BODY MASS INDEX: 21.74 KG/M2 | HEIGHT: 73 IN

## 2018-04-04 DIAGNOSIS — S30.822A BLISTER OF PENIS WITHOUT INFECTION, INITIAL ENCOUNTER: ICD-10-CM

## 2018-04-04 DIAGNOSIS — Z20.2 EXPOSURE TO STD: ICD-10-CM

## 2018-04-04 DIAGNOSIS — Z20.2 EXPOSURE TO STD: Primary | ICD-10-CM

## 2018-04-04 DIAGNOSIS — Z86.19: ICD-10-CM

## 2018-04-04 DIAGNOSIS — Z20.2 SEXUALLY TRANSMITTED DISEASE EXPOSURE: ICD-10-CM

## 2018-04-04 PROCEDURE — 87255 GENET VIRUS ISOLATE HSV: CPT | Performed by: NURSE PRACTITIONER

## 2018-04-04 PROCEDURE — 99214 OFFICE O/P EST MOD 30 MIN: CPT | Performed by: NURSE PRACTITIONER

## 2018-04-04 PROCEDURE — 86592 SYPHILIS TEST NON-TREP QUAL: CPT | Performed by: NURSE PRACTITIONER

## 2018-04-04 PROCEDURE — 87661 TRICHOMONAS VAGINALIS AMPLIF: CPT | Performed by: NURSE PRACTITIONER

## 2018-04-04 PROCEDURE — 87591 N.GONORRHOEAE DNA AMP PROB: CPT | Performed by: NURSE PRACTITIONER

## 2018-04-04 PROCEDURE — 87491 CHLMYD TRACH DNA AMP PROBE: CPT | Performed by: NURSE PRACTITIONER

## 2018-04-04 RX ORDER — ACYCLOVIR 400 MG/1
TABLET ORAL
Qty: 45 TABLET | Refills: 2 | Status: SHIPPED | OUTPATIENT
Start: 2018-04-04 | End: 2018-04-09 | Stop reason: SDUPTHER

## 2018-04-04 NOTE — PROGRESS NOTES
Subjective   Kris Lee is a 27 y.o. male who presents to the office complaining of sores on his penis that came up three days ago.  Started out with one that looked like a pimple and he popped with more appearing the next day.  Has noticed that they sting when he takes a shower or walks a lot.  Has only had intercourse with one girl in the past year.  Will occasionally wear a condom.  Admits has been treated for chlamydia and trich in the past.  Denies fever, chills.  Denies any difficulties with urinating.  Today is the fifth visit for STD issues.  PCP is Concepcion.  History of Present Illness     The following portions of the patient's history were reviewed and updated as appropriate: allergies, current medications, past family history, past medical history, past social history, past surgical history and problem list.    Review of Systems   Constitutional: Negative for chills, fatigue and fever.   HENT: Negative for congestion, sneezing, sore throat and trouble swallowing.    Eyes: Negative for visual disturbance.   Respiratory: Negative for cough, chest tightness, shortness of breath and wheezing.    Cardiovascular: Negative for chest pain, palpitations and leg swelling.   Gastrointestinal: Negative for abdominal pain, constipation, diarrhea, nausea and vomiting.   Genitourinary: Positive for genital sores. Negative for dysuria, frequency and urgency.   Musculoskeletal: Negative for neck pain.   Skin: Negative for rash.   Neurological: Negative for dizziness, weakness and headaches.   Psychiatric/Behavioral:        In the past two weeks the pt has not felt down, depressed, hopeless or lost interest in doing things   All other systems reviewed and are negative.      Objective   Physical Exam   Constitutional: He is oriented to person, place, and time. He appears well-developed and well-nourished. He is cooperative.  Non-toxic appearance. He does not have a sickly appearance. He appears distressed.   HENT:    Head: Normocephalic and atraumatic.   Right Ear: External ear normal.   Left Ear: External ear normal.   Nose: Nose normal.   Mouth/Throat: Uvula is midline, oropharynx is clear and moist and mucous membranes are normal.   Eyes: Conjunctivae and EOM are normal. Pupils are equal, round, and reactive to light. Right eye exhibits no discharge. Left eye exhibits no discharge. No scleral icterus.   Neck: Normal range of motion. Neck supple. No thyromegaly present.   Cardiovascular: Normal rate, regular rhythm, normal heart sounds and intact distal pulses.  Exam reveals no gallop and no friction rub.    No murmur heard.  Pulmonary/Chest: Effort normal and breath sounds normal. No respiratory distress. He has no wheezes. He has no rales.   Abdominal: Soft. Bowel sounds are normal. He exhibits no distension. There is no tenderness. There is no rebound and no guarding.   Genitourinary: Circumcised. Penile tenderness present. Discharge found.         Genitourinary Comments: Vesicles broken open with herpes swab and sent to lab for evaluation   Musculoskeletal: Normal range of motion. He exhibits no edema.   Lymphadenopathy:     He has no cervical adenopathy.   Neurological: He is alert and oriented to person, place, and time. No cranial nerve deficit. GCS eye subscore is 4. GCS verbal subscore is 5. GCS motor subscore is 6.   Skin: Skin is warm and dry. No rash noted.   Psychiatric: He has a normal mood and affect. His behavior is normal. Judgment and thought content normal.   Nursing note and vitals reviewed.      Assessment/Plan   Kris was seen today for blister.    Diagnoses and all orders for this visit:    Exposure to STD  -     Herpes Simplex Typing - Swab, Penis  -     RPR; Future  -     Neisseria Gonorrhea, PCR - Swab, Urine, Clean Catch; Future    Sexually transmitted disease exposure  -     RPR; Future  -     Neisseria Gonorrhea, PCR - Swab, Urine, Clean Catch; Future    History of Chlamydia trachomatis infection  by direct DNA probe  -     RPR; Future  -     Neisseria Gonorrhea, PCR - Swab, Urine, Clean Catch; Future    Blister of penis without infection, initial encounter  -     RPR; Future  -     Neisseria Gonorrhea, PCR - Swab, Urine, Clean Catch; Future    Other orders  -     acyclovir (ZOVIRAX) 400 MG tablet; Take one tablet by mouth three times daily x 10 days; if not healed by then continue taking    Stated that patient should not have intercourse for one month and is ONCE AGAIN HIGHLY ENCOURAGED to wear a condom when he has intercourse.  Is aware that he won't get rid of herpes and will have outbreaks for which he will need to take Acyclovir.  Will need to tell partner so she can be evaluated and treated.  Will notify him of lab results once completed.

## 2018-04-05 LAB
C TRACH RRNA CVX QL NAA+PROBE: NEGATIVE
N GONORRHOEA RRNA SPEC QL NAA+PROBE: NEGATIVE
T VAGINALIS DNA VAG QL PROBE+SIG AMP: NORMAL

## 2018-04-07 LAB
HSV SPEC CULT: ABNORMAL
RPR SER QL: NORMAL

## 2018-04-09 ENCOUNTER — TELEPHONE (OUTPATIENT)
Dept: FAMILY MEDICINE CLINIC | Facility: CLINIC | Age: 28
End: 2018-04-09

## 2018-04-09 ENCOUNTER — OFFICE VISIT (OUTPATIENT)
Dept: FAMILY MEDICINE CLINIC | Facility: CLINIC | Age: 28
End: 2018-04-09

## 2018-04-09 VITALS
HEIGHT: 73 IN | DIASTOLIC BLOOD PRESSURE: 68 MMHG | SYSTOLIC BLOOD PRESSURE: 128 MMHG | OXYGEN SATURATION: 98 % | TEMPERATURE: 98.4 F | WEIGHT: 164 LBS | BODY MASS INDEX: 21.74 KG/M2 | HEART RATE: 80 BPM

## 2018-04-09 DIAGNOSIS — A60.01 HERPES SIMPLEX INFECTION OF PENIS: Primary | ICD-10-CM

## 2018-04-09 PROCEDURE — 99213 OFFICE O/P EST LOW 20 MIN: CPT | Performed by: NURSE PRACTITIONER

## 2018-04-09 RX ORDER — ACYCLOVIR 400 MG/1
TABLET ORAL
Qty: 60 TABLET | Refills: 3 | Status: SHIPPED | OUTPATIENT
Start: 2018-04-09 | End: 2018-06-26 | Stop reason: SDUPTHER

## 2018-04-09 RX ORDER — ACYCLOVIR 400 MG/1
TABLET ORAL
Qty: 60 TABLET | Refills: 3 | Status: SHIPPED | OUTPATIENT
Start: 2018-04-09 | End: 2018-04-09 | Stop reason: SDUPTHER

## 2018-04-09 NOTE — TELEPHONE ENCOUNTER
Pt notified per PAR for appointment to come in and go over lab results.  Appointment mades for 4/9/2018 at 8:30 am

## 2018-04-09 NOTE — PROGRESS NOTES
Subjective   Kris Lee is a 27 y.o. male who presents to the office for lab follow-up, had blood and urine specimens given for STI testing.  Tells me that his penile blisters are much better, several are scabbed over.  Is taking the Acyclovir.  Current partner has had testing done but does not know the results.  History of Present Illness     The following portions of the patient's history were reviewed and updated as appropriate: allergies, current medications, past family history, past medical history, past social history, past surgical history and problem list.    Review of Systems   Constitutional: Negative for chills, fatigue and fever.   HENT: Negative for congestion, sneezing, sore throat and trouble swallowing.    Eyes: Negative for visual disturbance.   Respiratory: Negative for cough, chest tightness, shortness of breath and wheezing.    Cardiovascular: Negative for chest pain, palpitations and leg swelling.   Gastrointestinal: Negative for abdominal pain, constipation, diarrhea, nausea and vomiting.   Genitourinary: Negative for dysuria, frequency and urgency.   Musculoskeletal: Negative for neck pain.   Skin: Positive for rash.   Neurological: Negative for dizziness, weakness and headaches.   Psychiatric/Behavioral:        In the past two weeks the pt has not felt down, depressed, hopeless or lost interest in doing things   All other systems reviewed and are negative.      Objective   Physical Exam   Constitutional: He is oriented to person, place, and time. He appears well-developed and well-nourished.   HENT:   Head: Normocephalic and atraumatic.   Right Ear: External ear normal.   Left Ear: External ear normal.   Nose: Nose normal.   Mouth/Throat: Oropharynx is clear and moist.   Eyes: Conjunctivae and EOM are normal. Pupils are equal, round, and reactive to light. Right eye exhibits no discharge. Left eye exhibits no discharge. No scleral icterus.   Neck: Normal range of motion. Neck supple.  No thyromegaly present.   Cardiovascular: Normal rate, regular rhythm, normal heart sounds and intact distal pulses.  Exam reveals no gallop and no friction rub.    No murmur heard.  Pulmonary/Chest: Effort normal and breath sounds normal. No respiratory distress. He has no wheezes. He has no rales.   Abdominal: Soft. Bowel sounds are normal. He exhibits no distension. There is no tenderness. There is no rebound and no guarding.   Musculoskeletal: Normal range of motion. He exhibits no edema.   Lymphadenopathy:     He has no cervical adenopathy.   Neurological: He is alert and oriented to person, place, and time. No cranial nerve deficit.   Skin: Skin is warm and dry. Rash noted. Rash is vesicular (remain on Right-side of penis).   Psychiatric: He has a normal mood and affect. His behavior is normal. Judgment and thought content normal.   Nursing note and vitals reviewed.      Assessment/Plan   Kris was seen today for follow-up.    Diagnoses and all orders for this visit:    Herpes simplex infection of penis    Other orders  -     acyclovir (ZOVIRAX) 400 MG tablet; Take one tablet by mouth three times daily x 5 days; start ASAP after symptoms occur    Reviewed genital herpes precautions.  Acyclovir refill called in to pharmacy, he should be mindful of when he needs a refill.  HIGHLY ENCOURAGED to wear a condom with intercourse as he has no idea of when an outbreak will occur.  Printout given on information.  Pt verbalized understanding.

## 2018-04-09 NOTE — TELEPHONE ENCOUNTER
----- Message from SHANTI Holguin sent at 4/8/2018  2:09 PM CDT -----  Pls have him come in for office visit.

## 2018-06-26 ENCOUNTER — OFFICE VISIT (OUTPATIENT)
Dept: FAMILY MEDICINE CLINIC | Facility: CLINIC | Age: 28
End: 2018-06-26

## 2018-06-26 VITALS
HEART RATE: 65 BPM | TEMPERATURE: 98 F | WEIGHT: 164 LBS | DIASTOLIC BLOOD PRESSURE: 70 MMHG | BODY MASS INDEX: 22.96 KG/M2 | OXYGEN SATURATION: 99 % | HEIGHT: 71 IN | SYSTOLIC BLOOD PRESSURE: 128 MMHG

## 2018-06-26 DIAGNOSIS — R07.89 CHEST WALL TENDERNESS: Primary | ICD-10-CM

## 2018-06-26 PROCEDURE — 99213 OFFICE O/P EST LOW 20 MIN: CPT | Performed by: NURSE PRACTITIONER

## 2018-06-26 RX ORDER — IBUPROFEN 600 MG/1
600 TABLET ORAL EVERY 6 HOURS PRN
Qty: 30 TABLET | Refills: 0 | Status: SHIPPED | OUTPATIENT
Start: 2018-06-26 | End: 2018-08-21

## 2018-06-26 RX ORDER — CYCLOBENZAPRINE HCL 5 MG
5 TABLET ORAL NIGHTLY PRN
Qty: 14 TABLET | Refills: 0 | Status: SHIPPED | OUTPATIENT
Start: 2018-06-26 | End: 2018-08-21

## 2018-06-26 RX ORDER — ACYCLOVIR 400 MG/1
TABLET ORAL
Qty: 60 TABLET | Refills: 3 | Status: SHIPPED | OUTPATIENT
Start: 2018-06-26 | End: 2018-08-21 | Stop reason: SDUPTHER

## 2018-06-26 NOTE — PROGRESS NOTES
Subjective   Kris Lee is a 28 y.o. male who presents to the office complaining of chest pain that occurred after he was elbowed during a basketball game two weeks ago.  Has been taking OTC Tylenol and Ibuprofen with mild relief.  Will hurt after activity in the evening, sometimes interfere with sleep.  History of Present Illness     The following portions of the patient's history were reviewed and updated as appropriate: allergies, current medications, past family history, past medical history, past social history, past surgical history and problem list.    Review of Systems   Constitutional: Negative for chills, fatigue and fever.   HENT: Negative for congestion, sneezing, sore throat and trouble swallowing.    Eyes: Negative for visual disturbance.   Respiratory: Negative for cough, chest tightness, shortness of breath and wheezing.    Cardiovascular: Positive for chest pain. Negative for palpitations and leg swelling.   Gastrointestinal: Negative for abdominal pain, constipation, diarrhea, nausea and vomiting.   Genitourinary: Negative for dysuria, frequency and urgency.   Musculoskeletal: Negative for neck pain.        Right shoulder blade pain   Skin: Negative for rash.   Neurological: Negative for dizziness, weakness and headaches.   Psychiatric/Behavioral:        In the past two weeks the pt has not felt down, depressed, hopeless or lost interest in doing things   All other systems reviewed and are negative.      Objective   Physical Exam   Constitutional: He is oriented to person, place, and time. He appears well-developed and well-nourished. He is cooperative.  Non-toxic appearance. He does not have a sickly appearance. He does not appear ill.   HENT:   Head: Normocephalic and atraumatic.   Right Ear: External ear normal.   Left Ear: External ear normal.   Nose: Nose normal.   Mouth/Throat: Oropharynx is clear and moist.   Eyes: Conjunctivae and EOM are normal. Pupils are equal, round, and  reactive to light. Right eye exhibits no discharge. Left eye exhibits no discharge. No scleral icterus.   Neck: Normal range of motion. Neck supple. No thyromegaly present.   Cardiovascular: Normal rate, regular rhythm, normal heart sounds and intact distal pulses.  Exam reveals no gallop and no friction rub.    No murmur heard.  Pulmonary/Chest: Effort normal and breath sounds normal. No respiratory distress. He has no wheezes. He has no rales. He exhibits tenderness.       No bruising   Abdominal: Soft. Bowel sounds are normal. He exhibits no distension. There is no tenderness. There is no rebound and no guarding.   Musculoskeletal: Normal range of motion. He exhibits no edema.        Cervical back: He exhibits tenderness (with palpation of Right cervical tenderness).   Lymphadenopathy:     He has no cervical adenopathy.   Neurological: He is alert and oriented to person, place, and time. No cranial nerve deficit. GCS eye subscore is 4. GCS verbal subscore is 5. GCS motor subscore is 6.   Skin: Skin is warm and dry. No rash noted.   Psychiatric: He has a normal mood and affect. His behavior is normal. Judgment and thought content normal.   Nursing note and vitals reviewed.      Assessment/Plan   Kris was seen today for pain.    Diagnoses and all orders for this visit:    Chest wall tenderness    Other orders  -     acyclovir (ZOVIRAX) 400 MG tablet; Take one tablet by mouth three times daily x 5 days; start ASAP after symptoms occur  -     ibuprofen (ADVIL,MOTRIN) 600 MG tablet; Take 1 tablet by mouth Every 6 (Six) Hours As Needed for Mild Pain .  -     cyclobenzaprine (FLEXERIL) 5 MG tablet; Take 1 tablet by mouth At Night As Needed for Muscle Spasms.    Encouraged heating pad to Right chest as well as cervical/scapular area prn.  No basketball until the weekend.  Reviewed [facility]# [id number].

## 2018-08-21 ENCOUNTER — OFFICE VISIT (OUTPATIENT)
Dept: FAMILY MEDICINE CLINIC | Facility: CLINIC | Age: 28
End: 2018-08-21

## 2018-08-21 ENCOUNTER — LAB (OUTPATIENT)
Dept: LAB | Facility: OTHER | Age: 28
End: 2018-08-21

## 2018-08-21 VITALS
OXYGEN SATURATION: 99 % | WEIGHT: 160.4 LBS | DIASTOLIC BLOOD PRESSURE: 64 MMHG | BODY MASS INDEX: 22.46 KG/M2 | HEART RATE: 65 BPM | SYSTOLIC BLOOD PRESSURE: 118 MMHG | HEIGHT: 71 IN | TEMPERATURE: 98 F

## 2018-08-21 DIAGNOSIS — R36.9 PENILE DISCHARGE: ICD-10-CM

## 2018-08-21 DIAGNOSIS — R36.9 PENILE DISCHARGE: Primary | ICD-10-CM

## 2018-08-21 LAB
BACTERIA UR QL AUTO: ABNORMAL /HPF
BILIRUB UR QL STRIP: NEGATIVE
CLARITY UR: ABNORMAL
COLOR UR: ABNORMAL
GLUCOSE UR STRIP-MCNC: NEGATIVE MG/DL
HGB UR QL STRIP.AUTO: ABNORMAL
HYALINE CASTS UR QL AUTO: ABNORMAL /LPF
KETONES UR QL STRIP: NEGATIVE
LEUKOCYTE ESTERASE UR QL STRIP.AUTO: NEGATIVE
MUCOUS THREADS URNS QL MICRO: ABNORMAL /HPF
NITRITE UR QL STRIP: NEGATIVE
PH UR STRIP.AUTO: 5.5 [PH] (ref 5.5–8)
PROT UR QL STRIP: ABNORMAL
RBC # UR: ABNORMAL /HPF
REF LAB TEST METHOD: ABNORMAL
SP GR UR STRIP: >=1.03 (ref 1–1.03)
SQUAMOUS #/AREA URNS HPF: ABNORMAL /HPF
UROBILINOGEN UR QL STRIP: ABNORMAL
WBC UR QL AUTO: ABNORMAL /HPF

## 2018-08-21 PROCEDURE — 87591 N.GONORRHOEAE DNA AMP PROB: CPT | Performed by: NURSE PRACTITIONER

## 2018-08-21 PROCEDURE — 99213 OFFICE O/P EST LOW 20 MIN: CPT | Performed by: NURSE PRACTITIONER

## 2018-08-21 PROCEDURE — 87086 URINE CULTURE/COLONY COUNT: CPT | Performed by: NURSE PRACTITIONER

## 2018-08-21 PROCEDURE — 87491 CHLMYD TRACH DNA AMP PROBE: CPT | Performed by: NURSE PRACTITIONER

## 2018-08-21 PROCEDURE — 87661 TRICHOMONAS VAGINALIS AMPLIF: CPT | Performed by: NURSE PRACTITIONER

## 2018-08-21 PROCEDURE — 81001 URINALYSIS AUTO W/SCOPE: CPT | Performed by: NURSE PRACTITIONER

## 2018-08-21 RX ORDER — ACYCLOVIR 400 MG/1
TABLET ORAL
Qty: 60 TABLET | Refills: 3 | Status: SHIPPED | OUTPATIENT
Start: 2018-08-21 | End: 2018-09-24 | Stop reason: SDUPTHER

## 2018-08-21 NOTE — PROGRESS NOTES
Subjective   Kris Lee is a 28 y.o. male who presents to the office complaining of slight burning with urination along with a cloudy/whitish penile discharge.  At times he does have a stabbing pain in his penis.  Symptoms started one week ago.  Denies blood from penis.  Denies abd pain.  Denies fever.  Sexual partner denies symptoms.    History of Present Illness     The following portions of the patient's history were reviewed and updated as appropriate: allergies, current medications, past family history, past medical history, past social history, past surgical history and problem list.    Review of Systems   Constitutional: Negative for chills, fatigue and fever.   HENT: Negative for congestion, sneezing, sore throat and trouble swallowing.    Eyes: Negative for visual disturbance.   Respiratory: Negative for cough, chest tightness, shortness of breath and wheezing.    Cardiovascular: Negative for chest pain, palpitations and leg swelling.   Gastrointestinal: Negative for abdominal pain, constipation, diarrhea, nausea and vomiting.   Genitourinary: Positive for discharge and dysuria. Negative for frequency and urgency.   Musculoskeletal: Negative for neck pain.   Skin: Negative for rash.   Neurological: Negative for dizziness, weakness and headaches.   Psychiatric/Behavioral:        In the past two weeks the pt has not felt down, depressed, hopeless or lost interest in doing things   All other systems reviewed and are negative.      Objective   Physical Exam   Constitutional: He is oriented to person, place, and time. He appears well-developed and well-nourished. He is active and cooperative.  Non-toxic appearance. He does not have a sickly appearance. He does not appear ill. No distress.   HENT:   Head: Normocephalic and atraumatic.   Right Ear: External ear normal.   Left Ear: External ear normal.   Nose: Nose normal.   Mouth/Throat: Uvula is midline, oropharynx is clear and moist and mucous membranes  are normal.   Eyes: Pupils are equal, round, and reactive to light. Conjunctivae, EOM and lids are normal. Right eye exhibits no discharge. Left eye exhibits no discharge. No scleral icterus.   Neck: Trachea normal, normal range of motion and phonation normal. Neck supple. No thyromegaly present.   Cardiovascular: Normal rate, regular rhythm, normal heart sounds and intact distal pulses.  Exam reveals no gallop and no friction rub.    No murmur heard.  Pulmonary/Chest: Effort normal and breath sounds normal. No respiratory distress. He has no wheezes. He has no rales.   Abdominal: Soft. Normal appearance and bowel sounds are normal. He exhibits no distension. There is no tenderness. There is no rebound and no guarding.   Musculoskeletal: Normal range of motion. He exhibits no edema.   Lymphadenopathy:     He has no cervical adenopathy.   Neurological: He is alert and oriented to person, place, and time. No cranial nerve deficit. GCS eye subscore is 4. GCS verbal subscore is 5. GCS motor subscore is 6.   Skin: Skin is warm, dry and intact. Capillary refill takes less than 2 seconds. No rash noted.   Psychiatric: He has a normal mood and affect. His behavior is normal. Judgment and thought content normal.   Nursing note and vitals reviewed.      Assessment/Plan   Kris was seen today for follow-up.    Diagnoses and all orders for this visit:    Penile discharge    Other orders  -     acyclovir (ZOVIRAX) 400 MG tablet; Take one tablet by mouth three times daily x 5 days; start ASAP after symptoms occur    Encouraged to continue using safe sex practices.  Will notify of labs once resulted.

## 2018-08-22 ENCOUNTER — TELEPHONE (OUTPATIENT)
Dept: FAMILY MEDICINE CLINIC | Facility: CLINIC | Age: 28
End: 2018-08-22

## 2018-08-22 RX ORDER — CIPROFLOXACIN 500 MG/1
500 TABLET, FILM COATED ORAL 2 TIMES DAILY
Qty: 14 TABLET | Refills: 0 | Status: SHIPPED | OUTPATIENT
Start: 2018-08-22 | End: 2019-08-12

## 2018-08-22 NOTE — TELEPHONE ENCOUNTER
Spoke with pt and informed that he has UTI and that cipro 500 mg BID for 7 days sent in.  Pt voiced understand and thanked for call

## 2018-08-22 NOTE — TELEPHONE ENCOUNTER
----- Message from SHANTI Holguin sent at 8/22/2018  2:46 PM CDT -----  Pls inform patient he has a UTI, send in Cipro 500 mg x 7 days.

## 2018-08-23 LAB — BACTERIA SPEC AEROBE CULT: NORMAL

## 2018-09-24 RX ORDER — ACYCLOVIR 400 MG/1
TABLET ORAL
Qty: 60 TABLET | Refills: 5 | Status: SHIPPED | OUTPATIENT
Start: 2018-09-24 | End: 2019-05-17 | Stop reason: SDUPTHER

## 2019-05-17 DIAGNOSIS — A60.01 HERPES SIMPLEX INFECTION OF PENIS: Primary | ICD-10-CM

## 2019-05-17 RX ORDER — ACYCLOVIR 400 MG/1
TABLET ORAL
Qty: 60 TABLET | Refills: 5 | Status: SHIPPED | OUTPATIENT
Start: 2019-05-17 | End: 2019-08-23 | Stop reason: SDUPTHER

## 2019-08-12 ENCOUNTER — OFFICE VISIT (OUTPATIENT)
Dept: FAMILY MEDICINE CLINIC | Facility: CLINIC | Age: 29
End: 2019-08-12

## 2019-08-12 VITALS
HEART RATE: 91 BPM | SYSTOLIC BLOOD PRESSURE: 110 MMHG | OXYGEN SATURATION: 99 % | WEIGHT: 164 LBS | BODY MASS INDEX: 22.96 KG/M2 | DIASTOLIC BLOOD PRESSURE: 70 MMHG | HEIGHT: 71 IN | TEMPERATURE: 98.8 F

## 2019-08-12 DIAGNOSIS — K13.79 MOUTH PAIN: Primary | ICD-10-CM

## 2019-08-12 PROCEDURE — 99213 OFFICE O/P EST LOW 20 MIN: CPT | Performed by: NURSE PRACTITIONER

## 2019-08-12 RX ORDER — AMOXICILLIN AND CLAVULANATE POTASSIUM 875; 125 MG/1; MG/1
1 TABLET, FILM COATED ORAL 2 TIMES DAILY
Qty: 14 TABLET | Refills: 0 | Status: SHIPPED | OUTPATIENT
Start: 2019-08-12 | End: 2019-08-23

## 2019-08-12 RX ORDER — LIDOCAINE HYDROCHLORIDE 20 MG/ML
SOLUTION OROPHARYNGEAL
Qty: 145 ML | Refills: 0 | Status: SHIPPED | OUTPATIENT
Start: 2019-08-12 | End: 2019-08-23 | Stop reason: SDUPTHER

## 2019-08-23 ENCOUNTER — OFFICE VISIT (OUTPATIENT)
Dept: FAMILY MEDICINE CLINIC | Facility: CLINIC | Age: 29
End: 2019-08-23

## 2019-08-23 VITALS
TEMPERATURE: 97.7 F | SYSTOLIC BLOOD PRESSURE: 100 MMHG | BODY MASS INDEX: 22.96 KG/M2 | HEART RATE: 90 BPM | HEIGHT: 71 IN | WEIGHT: 164 LBS | OXYGEN SATURATION: 98 % | DIASTOLIC BLOOD PRESSURE: 66 MMHG

## 2019-08-23 DIAGNOSIS — A60.01 HERPES SIMPLEX INFECTION OF PENIS: ICD-10-CM

## 2019-08-23 PROCEDURE — 99213 OFFICE O/P EST LOW 20 MIN: CPT | Performed by: NURSE PRACTITIONER

## 2019-08-23 RX ORDER — FLUCONAZOLE 150 MG/1
TABLET ORAL
Refills: 0 | COMMUNITY
Start: 2019-08-18 | End: 2019-12-03

## 2019-08-23 RX ORDER — ACYCLOVIR 400 MG/1
TABLET ORAL
Qty: 60 TABLET | Refills: 5 | Status: SHIPPED | OUTPATIENT
Start: 2019-08-23 | End: 2019-12-03 | Stop reason: SDUPTHER

## 2019-08-23 RX ORDER — LIDOCAINE HYDROCHLORIDE 20 MG/ML
SOLUTION OROPHARYNGEAL
Qty: 145 ML | Refills: 0 | Status: SHIPPED | OUTPATIENT
Start: 2019-08-23 | End: 2019-12-03 | Stop reason: SDUPTHER

## 2019-09-09 PROBLEM — K13.79 MOUTH PAIN: Status: ACTIVE | Noted: 2019-09-09

## 2019-09-09 NOTE — PROGRESS NOTES
Subjective   Kris Lee is a 29 y.o. male who presents to the office for follow-up of sores in his mouth.  Has used the numbing compound.  Denies oral sex.    History of Present Illness     The following portions of the patient's history were reviewed and updated as appropriate: allergies, current medications, past family history, past medical history, past social history, past surgical history and problem list.    Review of Systems   Constitutional: Negative for chills, fatigue and fever.   HENT: Negative for congestion, sneezing, sore throat and trouble swallowing.    Eyes: Negative for visual disturbance.   Respiratory: Negative for cough, chest tightness, shortness of breath and wheezing.    Cardiovascular: Negative for chest pain, palpitations and leg swelling.   Gastrointestinal: Negative for abdominal pain, constipation, diarrhea, nausea and vomiting.   Genitourinary: Negative for dysuria, frequency and urgency.   Musculoskeletal: Negative for neck pain.   Skin: Positive for rash.   Neurological: Negative for dizziness, weakness and headaches.   Psychiatric/Behavioral:        In the past two weeks the pt has not felt down, depressed, hopeless or lost interest in doing things   All other systems reviewed and are negative.      Objective   Physical Exam   Constitutional: He is oriented to person, place, and time. He appears well-developed and well-nourished. He is cooperative.  Non-toxic appearance. No distress.   HENT:   Head: Normocephalic and atraumatic.   Right Ear: Tympanic membrane and external ear normal.   Left Ear: Tympanic membrane and external ear normal.   Nose: Nose normal.   Mouth/Throat: Uvula is midline, oropharynx is clear and moist and mucous membranes are normal.   I do not see any lesions that would correspond to his complaint   Eyes: Conjunctivae and EOM are normal. Pupils are equal, round, and reactive to light. Right eye exhibits no discharge. Left eye exhibits no discharge. No  scleral icterus.   Neck: Normal range of motion. Neck supple. No thyromegaly present.   Cardiovascular: Normal rate, regular rhythm, normal heart sounds and intact distal pulses. Exam reveals no gallop and no friction rub.   No murmur heard.  Pulmonary/Chest: Effort normal and breath sounds normal. No respiratory distress. He has no wheezes. He has no rales.   Abdominal: Soft. Bowel sounds are normal. He exhibits no distension. There is no tenderness. There is no rebound and no guarding.   Musculoskeletal: Normal range of motion. He exhibits no edema.   Lymphadenopathy:     He has no cervical adenopathy.   Neurological: He is alert and oriented to person, place, and time. No cranial nerve deficit.   Skin: Skin is warm and dry. No rash noted.   Psychiatric: He has a normal mood and affect. His behavior is normal. Judgment and thought content normal.   Nursing note and vitals reviewed.      Assessment/Plan   Kris was seen today for mouth lesions.    Diagnoses and all orders for this visit:    Herpes simplex infection of penis  -     acyclovir (ZOVIRAX) 400 MG tablet; Take one tablet by mouth three times daily x 5 days; start ASAP after symptoms occur    Other orders  -     Lidocaine Viscous HCl (XYLOCAINE) 2 % solution; Please mix with Benadryl and Maalox to make 1:1:1.  Swish and swallow one tsp one hour prior to meals and bedtime.  Please flavor if able    Will cover with antibiotic and another round of magic mouthwash.  Rinse with half-strength hydrogen peroxide.

## 2019-09-09 NOTE — PROGRESS NOTES
"Subjective   Kris Lee is a 29 y.o. male who presents to the office complaining of sores in his mouth.  Was seen at [facility] and treated with Diflucan for a yeast infection.   Has been taking Zcyclovir TID since Tuesday with no difference.  Last dental visit was in November.  Tells me that \"just moving his mouth hurts.\"  Denies eating tomatoes or acidic foods.  History of Present Illness     The following portions of the patient's history were reviewed and updated as appropriate: allergies, current medications, past family history, past medical history, past social history, past surgical history and problem list.    Review of Systems   Constitutional: Negative for chills, fatigue and fever.   HENT: Negative for congestion, sneezing, sore throat and trouble swallowing.    Eyes: Negative for visual disturbance.   Respiratory: Negative for cough, chest tightness, shortness of breath and wheezing.    Cardiovascular: Negative for chest pain, palpitations and leg swelling.   Gastrointestinal: Negative for abdominal pain, constipation, diarrhea, nausea and vomiting.   Genitourinary: Negative for dysuria, frequency and urgency.   Musculoskeletal: Negative for neck pain.   Skin: Positive for rash.   Neurological: Negative for dizziness, weakness and headaches.   Psychiatric/Behavioral:        In the past two weeks the pt has not felt down, depressed, hopeless or lost interest in doing things   All other systems reviewed and are negative.      Objective   Physical Exam   Constitutional: He is oriented to person, place, and time. He appears well-developed and well-nourished. He is cooperative.  Non-toxic appearance. No distress.   HENT:   Head: Normocephalic and atraumatic.   Right Ear: External ear normal.   Left Ear: External ear normal.   Nose: Nose normal.   Mouth/Throat: Uvula is midline, oropharynx is clear and moist and mucous membranes are normal.   Do not appreciate discernible lesions of his oral tissue "   Eyes: Conjunctivae and EOM are normal. Pupils are equal, round, and reactive to light. Right eye exhibits no discharge. Left eye exhibits no discharge. No scleral icterus.   Neck: Normal range of motion. Neck supple. No thyromegaly present.   Cardiovascular: Normal rate, regular rhythm, normal heart sounds and intact distal pulses. Exam reveals no gallop and no friction rub.   No murmur heard.  Pulmonary/Chest: Effort normal and breath sounds normal. No respiratory distress. He has no wheezes. He has no rales.   Abdominal: Soft. Bowel sounds are normal. He exhibits no distension. There is no tenderness. There is no rebound and no guarding.   Musculoskeletal: Normal range of motion. He exhibits no edema.   Lymphadenopathy:     He has no cervical adenopathy.   Neurological: He is alert and oriented to person, place, and time. No cranial nerve deficit. GCS eye subscore is 4. GCS verbal subscore is 5. GCS motor subscore is 6.   Skin: Skin is warm, dry and intact. Capillary refill takes less than 2 seconds. No rash noted.   Psychiatric: He has a normal mood and affect. His behavior is normal. Judgment and thought content normal.   Nursing note and vitals reviewed.      Assessment/Plan   Kris was seen today for mouth lesions.    Diagnoses and all orders for this visit:    Mouth pain    Other orders  -     Discontinue: amoxicillin-clavulanate (AUGMENTIN) 875-125 MG per tablet; Take 1 tablet by mouth 2 (Two) Times a Day.  -     Discontinue: Lidocaine Viscous HCl (XYLOCAINE) 2 % solution; Please mix with Benadryl and Maalox to make 1:1:1.  Swish and swallow one tsp one hour prior to meals and bedtime.  Please flavor if able    Encouraged adequate fluid intake.  Will cover with another round of Acyclovir and magic mouthwash.  Good oral hygiene.

## 2019-12-03 ENCOUNTER — OFFICE VISIT (OUTPATIENT)
Dept: FAMILY MEDICINE CLINIC | Facility: CLINIC | Age: 29
End: 2019-12-03

## 2019-12-03 ENCOUNTER — LAB (OUTPATIENT)
Dept: LAB | Facility: OTHER | Age: 29
End: 2019-12-03

## 2019-12-03 VITALS
OXYGEN SATURATION: 97 % | HEIGHT: 71 IN | HEART RATE: 85 BPM | BODY MASS INDEX: 22.96 KG/M2 | WEIGHT: 164 LBS | DIASTOLIC BLOOD PRESSURE: 74 MMHG | SYSTOLIC BLOOD PRESSURE: 118 MMHG

## 2019-12-03 DIAGNOSIS — A60.01 HERPES SIMPLEX INFECTION OF PENIS: ICD-10-CM

## 2019-12-03 DIAGNOSIS — Z00.00 GENERAL MEDICAL EXAM: Primary | ICD-10-CM

## 2019-12-03 DIAGNOSIS — K13.79 MOUTH SORES: ICD-10-CM

## 2019-12-03 DIAGNOSIS — Z00.00 GENERAL MEDICAL EXAM: ICD-10-CM

## 2019-12-03 LAB
ALBUMIN SERPL-MCNC: 5 G/DL (ref 3.5–5)
ALBUMIN/GLOB SERPL: 1.5 G/DL (ref 1.1–1.8)
ALP SERPL-CCNC: 63 U/L (ref 38–126)
ALT SERPL W P-5'-P-CCNC: 13 U/L
ANION GAP SERPL CALCULATED.3IONS-SCNC: 8 MMOL/L (ref 5–15)
AST SERPL-CCNC: 29 U/L (ref 17–59)
BASOPHILS # BLD AUTO: 0.01 10*3/MM3 (ref 0–0.2)
BASOPHILS NFR BLD AUTO: 0.2 % (ref 0–1.5)
BILIRUB SERPL-MCNC: 0.8 MG/DL (ref 0.2–1.3)
BILIRUB UR QL STRIP: NEGATIVE
BUN BLD-MCNC: 13 MG/DL (ref 7–23)
BUN/CREAT SERPL: 11 (ref 7–25)
CALCIUM SPEC-SCNC: 10.4 MG/DL (ref 8.4–10.2)
CHLORIDE SERPL-SCNC: 102 MMOL/L (ref 101–112)
CHOLEST SERPL-MCNC: 240 MG/DL (ref 150–200)
CLARITY UR: CLEAR
CO2 SERPL-SCNC: 34 MMOL/L (ref 22–30)
COLOR UR: YELLOW
CREAT BLD-MCNC: 1.18 MG/DL (ref 0.7–1.3)
DEPRECATED RDW RBC AUTO: 40.6 FL (ref 37–54)
EOSINOPHIL # BLD AUTO: 0.24 10*3/MM3 (ref 0–0.4)
EOSINOPHIL NFR BLD AUTO: 4 % (ref 0.3–6.2)
ERYTHROCYTE [DISTWIDTH] IN BLOOD BY AUTOMATED COUNT: 11.8 % (ref 12.3–15.4)
GFR SERPL CREATININE-BSD FRML MDRD: 74 ML/MIN/1.73 (ref 77–179)
GLOBULIN UR ELPH-MCNC: 3.4 GM/DL (ref 2.3–3.5)
GLUCOSE BLD-MCNC: 94 MG/DL (ref 70–99)
GLUCOSE UR STRIP-MCNC: NEGATIVE MG/DL
HCT VFR BLD AUTO: 46.4 % (ref 37.5–51)
HDLC SERPL-MCNC: 33 MG/DL (ref 40–59)
HGB BLD-MCNC: 15.2 G/DL (ref 13–17.7)
HGB UR QL STRIP.AUTO: NEGATIVE
KETONES UR QL STRIP: NEGATIVE
LDLC SERPL CALC-MCNC: 167 MG/DL
LDLC/HDLC SERPL: 5.07 {RATIO} (ref 0–3.55)
LEUKOCYTE ESTERASE UR QL STRIP.AUTO: NEGATIVE
LYMPHOCYTES # BLD AUTO: 1.59 10*3/MM3 (ref 0.7–3.1)
LYMPHOCYTES NFR BLD AUTO: 26.2 % (ref 19.6–45.3)
MCH RBC QN AUTO: 31.3 PG (ref 26.6–33)
MCHC RBC AUTO-ENTMCNC: 32.8 G/DL (ref 31.5–35.7)
MCV RBC AUTO: 95.5 FL (ref 79–97)
MONOCYTES # BLD AUTO: 0.68 10*3/MM3 (ref 0.1–0.9)
MONOCYTES NFR BLD AUTO: 11.2 % (ref 5–12)
NEUTROPHILS # BLD AUTO: 3.55 10*3/MM3 (ref 1.7–7)
NEUTROPHILS NFR BLD AUTO: 58.4 % (ref 42.7–76)
NITRITE UR QL STRIP: NEGATIVE
PH UR STRIP.AUTO: 6.5 [PH] (ref 5.5–8)
PLATELET # BLD AUTO: 152 10*3/MM3 (ref 140–450)
PMV BLD AUTO: 10.5 FL (ref 6–12)
POTASSIUM BLD-SCNC: 4.4 MMOL/L (ref 3.4–5)
PROT SERPL-MCNC: 8.4 G/DL (ref 6.3–8.6)
PROT UR QL STRIP: NEGATIVE
RBC # BLD AUTO: 4.86 10*6/MM3 (ref 4.14–5.8)
SODIUM BLD-SCNC: 144 MMOL/L (ref 137–145)
SP GR UR STRIP: 1.02 (ref 1–1.03)
TRIGL SERPL-MCNC: 198 MG/DL
UROBILINOGEN UR QL STRIP: NORMAL
VLDLC SERPL-MCNC: 39.6 MG/DL
WBC NRBC COR # BLD: 6.07 10*3/MM3 (ref 3.4–10.8)

## 2019-12-03 PROCEDURE — 86695 HERPES SIMPLEX TYPE 1 TEST: CPT | Performed by: NURSE PRACTITIONER

## 2019-12-03 PROCEDURE — 80061 LIPID PANEL: CPT | Performed by: NURSE PRACTITIONER

## 2019-12-03 PROCEDURE — 82607 VITAMIN B-12: CPT | Performed by: NURSE PRACTITIONER

## 2019-12-03 PROCEDURE — 82306 VITAMIN D 25 HYDROXY: CPT | Performed by: NURSE PRACTITIONER

## 2019-12-03 PROCEDURE — 86696 HERPES SIMPLEX TYPE 2 TEST: CPT | Performed by: NURSE PRACTITIONER

## 2019-12-03 PROCEDURE — 86696 HERPES SIMPLEX TYPE 2 TEST: CPT

## 2019-12-03 PROCEDURE — 81003 URINALYSIS AUTO W/O SCOPE: CPT | Performed by: NURSE PRACTITIONER

## 2019-12-03 PROCEDURE — 80050 GENERAL HEALTH PANEL: CPT | Performed by: NURSE PRACTITIONER

## 2019-12-03 PROCEDURE — 99213 OFFICE O/P EST LOW 20 MIN: CPT | Performed by: NURSE PRACTITIONER

## 2019-12-03 RX ORDER — LORATADINE 10 MG/1
10 TABLET ORAL DAILY
Qty: 30 TABLET | Refills: 5 | Status: SHIPPED | OUTPATIENT
Start: 2019-12-03

## 2019-12-03 RX ORDER — ACYCLOVIR 400 MG/1
TABLET ORAL
Qty: 60 TABLET | Refills: 5 | Status: SHIPPED | OUTPATIENT
Start: 2019-12-03 | End: 2020-08-12 | Stop reason: SDUPTHER

## 2019-12-03 RX ORDER — LIDOCAINE HYDROCHLORIDE 20 MG/ML
SOLUTION OROPHARYNGEAL
Qty: 145 ML | Refills: 0 | Status: SHIPPED | OUTPATIENT
Start: 2019-12-03 | End: 2019-12-06 | Stop reason: SDUPTHER

## 2019-12-03 NOTE — PROGRESS NOTES
"Subjective   Kris Lee is a 29 y.o. male who presents to the office for mouth sores follow-up.  Tells me that since last visit he did have relief of these same symptoms but that it \"took forever.\"  Was taking Acyclovir and magic mouthwash.    Also complaining of having to rub his eyes often because they feel itchy and are watery.  Declines flu shot.  History of Present Illness     The following portions of the patient's history were reviewed and updated as appropriate: allergies, current medications, past family history, past medical history, past social history, past surgical history and problem list.    Review of Systems   Constitutional: Negative for chills, fatigue and fever.   HENT: Positive for mouth sores. Negative for congestion, sneezing, sore throat and trouble swallowing.    Eyes: Positive for discharge and itching. Negative for visual disturbance.   Respiratory: Negative for cough, chest tightness, shortness of breath and wheezing.    Cardiovascular: Negative for chest pain, palpitations and leg swelling.   Gastrointestinal: Negative for abdominal pain, constipation, diarrhea, nausea and vomiting.   Genitourinary: Negative for dysuria, frequency and urgency.   Musculoskeletal: Negative for neck pain.   Skin: Negative for rash.   Neurological: Negative for dizziness, weakness and headaches.   Psychiatric/Behavioral:        In the past two weeks the pt has not felt down, depressed, hopeless or lost interest in doing things   All other systems reviewed and are negative.      Objective   Physical Exam   Constitutional: He is oriented to person, place, and time. He appears well-developed and well-nourished. He is cooperative.  Non-toxic appearance. No distress.   HENT:   Head: Normocephalic and atraumatic.   Right Ear: External ear normal.   Left Ear: External ear normal.   Nose: Nose normal.   Mouth/Throat: Uvula is midline, oropharynx is clear and moist and mucous membranes are normal. Oral " lesions: no obvious oral lesions noted.   Eyes: Conjunctivae, EOM and lids are normal. Pupils are equal, round, and reactive to light. Left eye exhibits no discharge. No scleral icterus.   Bilat tearing   Neck: Normal range of motion. Neck supple. No thyromegaly present.   Cardiovascular: Normal rate, regular rhythm, normal heart sounds and intact distal pulses. Exam reveals no gallop and no friction rub.   No murmur heard.  Pulmonary/Chest: Effort normal and breath sounds normal. No respiratory distress. He has no wheezes. He has no rales.   Abdominal: Soft. Bowel sounds are normal. He exhibits no distension. There is no tenderness. There is no rebound and no guarding.   Musculoskeletal: Normal range of motion. He exhibits no edema.   Lymphadenopathy:     He has no cervical adenopathy.   Neurological: He is alert and oriented to person, place, and time. No cranial nerve deficit. GCS eye subscore is 4. GCS verbal subscore is 5. GCS motor subscore is 6.   Skin: Skin is warm, dry and intact. Capillary refill takes less than 2 seconds. No rash noted.   Psychiatric: He has a normal mood and affect. His behavior is normal. Judgment and thought content normal.   Nursing note and vitals reviewed.      Assessment/Plan   Kris was seen today for mouth lesions.    Diagnoses and all orders for this visit:    General medical exam  -     CBC & Differential; Future  -     Comprehensive Metabolic Panel; Future  -     TSH; Future  -     Urinalysis With Culture If Indicated -; Future  -     Vitamin B12; Future  -     Vitamin D 25 Hydroxy; Future  -     Lipid Panel; Future    Mouth sores  -     HSV 1 and 2 IgM, Abs, Indirect; Future  -     HSV 1 and 2-Specific Ab, IgG; Future    Other orders  -     loratadine (CLARITIN) 10 MG tablet; Take 1 tablet by mouth Daily.    Will complete another round of previous treatment.  Encouraged to stay away from acidic foods.  Excellent mouth care.    Good handwashing.

## 2019-12-04 LAB
25(OH)D3 SERPL-MCNC: 25.9 NG/ML (ref 30–100)
TSH SERPL DL<=0.05 MIU/L-ACNC: 3.18 UIU/ML (ref 0.27–4.2)
VIT B12 BLD-MCNC: 713 PG/ML (ref 211–946)

## 2019-12-05 LAB
HSV-2 IGG SUPPLEMENTAL TEST: POSITIVE
HSV1 IGG SER IA-ACNC: <0.91 INDEX (ref 0–0.9)
HSV2 IGG SER IA-ACNC: 2.61 INDEX (ref 0–0.9)

## 2019-12-06 DIAGNOSIS — K13.79 MOUTH SORES: ICD-10-CM

## 2019-12-06 DIAGNOSIS — A60.01 HERPES SIMPLEX INFECTION OF PENIS: Primary | ICD-10-CM

## 2019-12-06 RX ORDER — LIDOCAINE HYDROCHLORIDE 20 MG/ML
SOLUTION OROPHARYNGEAL
Qty: 145 ML | Refills: 0 | Status: SHIPPED | OUTPATIENT
Start: 2019-12-06 | End: 2019-12-13 | Stop reason: SDUPTHER

## 2019-12-09 LAB
HSV1 IGM TITR SER IF: NORMAL TITER
HSV2 IGM TITR SER IF: NORMAL TITER

## 2019-12-11 DIAGNOSIS — B00.9 HERPES: ICD-10-CM

## 2019-12-11 DIAGNOSIS — K13.70 MOUTH LESION: Primary | ICD-10-CM

## 2019-12-13 ENCOUNTER — OFFICE VISIT (OUTPATIENT)
Dept: FAMILY MEDICINE CLINIC | Facility: CLINIC | Age: 29
End: 2019-12-13

## 2019-12-13 VITALS
DIASTOLIC BLOOD PRESSURE: 70 MMHG | OXYGEN SATURATION: 98 % | HEIGHT: 71 IN | HEART RATE: 90 BPM | SYSTOLIC BLOOD PRESSURE: 118 MMHG | WEIGHT: 164 LBS | BODY MASS INDEX: 22.96 KG/M2

## 2019-12-13 DIAGNOSIS — J30.9 ALLERGIC RHINITIS, UNSPECIFIED SEASONALITY, UNSPECIFIED TRIGGER: Primary | ICD-10-CM

## 2019-12-13 DIAGNOSIS — E78.2 HYPERCHOLESTEROLEMIA WITH HYPERTRIGLYCERIDEMIA: ICD-10-CM

## 2019-12-13 DIAGNOSIS — R89.9 ABNORMAL LABORATORY TEST RESULT: ICD-10-CM

## 2019-12-13 DIAGNOSIS — K13.79 MOUTH SORES: ICD-10-CM

## 2019-12-13 PROCEDURE — 99213 OFFICE O/P EST LOW 20 MIN: CPT | Performed by: NURSE PRACTITIONER

## 2019-12-17 RX ORDER — LIDOCAINE HYDROCHLORIDE 20 MG/ML
SOLUTION OROPHARYNGEAL
Qty: 145 ML | Refills: 0 | Status: SHIPPED | OUTPATIENT
Start: 2019-12-17 | End: 2019-12-17 | Stop reason: SDUPTHER

## 2019-12-17 RX ORDER — LIDOCAINE HYDROCHLORIDE 20 MG/ML
SOLUTION OROPHARYNGEAL
Qty: 145 ML | Refills: 0 | Status: SHIPPED | OUTPATIENT
Start: 2019-12-17 | End: 2020-01-03 | Stop reason: SDUPTHER

## 2019-12-29 PROBLEM — R89.9 ABNORMAL LABORATORY TEST RESULT: Status: ACTIVE | Noted: 2019-12-29

## 2019-12-29 PROBLEM — J30.9 ALLERGIC RHINITIS: Status: ACTIVE | Noted: 2019-12-29

## 2019-12-29 PROBLEM — E78.2 HYPERCHOLESTEROLEMIA WITH HYPERTRIGLYCERIDEMIA: Status: ACTIVE | Noted: 2019-12-29

## 2019-12-30 NOTE — PROGRESS NOTES
Subjective   Kris Lee is a 29 y.o. male who presents to the office for review of sores in his mouth.  Tells me that the magic mouthwash did help, would like a refill.  Also having nasal drainage, runny eyes and sore throat.  Reviewed labs with patient.  Denies any form of sexual interaction.  History of Present Illness     The following portions of the patient's history were reviewed and updated as appropriate: allergies, current medications, past family history, past medical history, past social history, past surgical history and problem list.    Review of Systems   Constitutional: Negative for chills, fatigue and fever.   HENT: Positive for mouth sores, rhinorrhea and sore throat. Negative for congestion, sneezing and trouble swallowing.    Eyes: Positive for discharge. Negative for visual disturbance.   Respiratory: Negative for cough, chest tightness, shortness of breath and wheezing.    Cardiovascular: Negative for chest pain, palpitations and leg swelling.   Gastrointestinal: Negative for abdominal pain, constipation, diarrhea, nausea and vomiting.   Genitourinary: Negative for dysuria, frequency and urgency.   Musculoskeletal: Negative for neck pain.   Skin: Negative for rash.   Neurological: Negative for dizziness, weakness and headaches.   Psychiatric/Behavioral:        In the past two weeks the pt has not felt down, depressed, hopeless or lost interest in doing things   All other systems reviewed and are negative.      Objective   Physical Exam   Constitutional: He is oriented to person, place, and time. He appears well-developed and well-nourished. He is cooperative.  Non-toxic appearance. No distress.   HENT:   Head: Normocephalic and atraumatic.   Right Ear: Tympanic membrane and external ear normal.   Left Ear: Tympanic membrane and external ear normal.   Nose: Rhinorrhea present. No mucosal edema.   Mouth/Throat: Uvula is midline, oropharynx is clear and moist and mucous membranes are  normal. No oral lesions.   Do not see any obvious buccal abnormalities   Eyes: Pupils are equal, round, and reactive to light. Conjunctivae, EOM and lids are normal. Right eye exhibits no discharge. Left eye exhibits no discharge. No scleral icterus.   Clear tearing   Neck: Normal range of motion. Neck supple. No thyromegaly present.   Cardiovascular: Normal rate, regular rhythm, normal heart sounds and intact distal pulses. Exam reveals no gallop and no friction rub.   No murmur heard.  Pulmonary/Chest: Effort normal and breath sounds normal. No respiratory distress. He has no wheezes. He has no rales.   Abdominal: Soft. Bowel sounds are normal. He exhibits no distension. There is no tenderness. There is no rebound and no guarding.   Musculoskeletal: Normal range of motion. He exhibits no edema.   Lymphadenopathy:     He has no cervical adenopathy.   Neurological: He is alert and oriented to person, place, and time. No cranial nerve deficit.   Skin: Skin is warm and dry. No rash noted.   Psychiatric: He has a normal mood and affect. His behavior is normal. Judgment and thought content normal.   Nursing note and vitals reviewed.      Assessment/Plan   Kris was seen today for annual exam.    Diagnoses and all orders for this visit:    Allergic rhinitis, unspecified seasonality, unspecified trigger    Mouth sores  -     Discontinue: Lidocaine Viscous HCl (XYLOCAINE) 2 % solution; Please mix with Benadryl and Maalox to make 1:1:1.  Swish and swallow one tsp one hour prior to meals and bedtime.  Please flavor if able  -     Lidocaine Viscous HCl (XYLOCAINE) 2 % solution; Please mix with Benadryl and Maalox to make 1:1:1.  Swish and swallow one tsp one hour prior to meals and bedtime.  Please flavor if able  -     Ambulatory Referral to Dermatology    Abnormal laboratory test result    Hypercholesterolemia with hypertriglyceridemia    Encouraged heart healthy diet, cardio at least three times a week.  Will refer to  dermatology for oral lesions.  Will refill magic mouthwash.

## 2020-01-03 ENCOUNTER — OFFICE VISIT (OUTPATIENT)
Dept: FAMILY MEDICINE CLINIC | Facility: CLINIC | Age: 30
End: 2020-01-03

## 2020-01-03 VITALS
SYSTOLIC BLOOD PRESSURE: 116 MMHG | BODY MASS INDEX: 22.96 KG/M2 | HEIGHT: 71 IN | WEIGHT: 164 LBS | OXYGEN SATURATION: 97 % | DIASTOLIC BLOOD PRESSURE: 70 MMHG | HEART RATE: 91 BPM

## 2020-01-03 DIAGNOSIS — A60.01 HERPES SIMPLEX INFECTION OF PENIS: ICD-10-CM

## 2020-01-03 DIAGNOSIS — K13.79 MOUTH SORES: Primary | ICD-10-CM

## 2020-01-03 PROCEDURE — 99213 OFFICE O/P EST LOW 20 MIN: CPT | Performed by: NURSE PRACTITIONER

## 2020-01-03 RX ORDER — LIDOCAINE HYDROCHLORIDE 20 MG/ML
SOLUTION OROPHARYNGEAL
Qty: 145 ML | Refills: 0 | Status: SHIPPED | OUTPATIENT
Start: 2020-01-03 | End: 2020-08-12 | Stop reason: SDUPTHER

## 2020-01-03 RX ORDER — ACYCLOVIR 400 MG/1
TABLET ORAL
Qty: 60 TABLET | Refills: 5 | Status: CANCELLED | OUTPATIENT
Start: 2020-01-03

## 2020-01-03 NOTE — PROGRESS NOTES
"Subjective   Kris Lee is a 29 y.o. male who presents to the office for evaluation of a \"weird blister that popped last Monday.\"  Also states that this blister was in the RIGHT upper part of his mouth, soft tissue side.  When it popped he had clear liquid to come out and it tasted sour.  At that time it was the size of a bb.  Denies pain at that time.  It came back a few hours later and it was smaller.    Tells me that he's able to eat well.  Denies drainage from this lesion described above.  Has a dermatology appt on 1/23.  Has appt with dentist on 2/10 for teeth cleaning.  Stated that he had taken Acyclovir TID, is aware that he is not to take that every day for the rest of his life.  Should take only when outbreak occurs.  History of Present Illness     The following portions of the patient's history were reviewed and updated as appropriate: allergies, current medications, past family history, past medical history, past social history, past surgical history and problem list.    Review of Systems   Constitutional: Negative for chills, fatigue and fever.   HENT: Positive for mouth sores. Negative for congestion, sneezing, sore throat and trouble swallowing.    Eyes: Negative for visual disturbance.   Respiratory: Negative for cough, chest tightness, shortness of breath and wheezing.    Cardiovascular: Negative for chest pain, palpitations and leg swelling.   Gastrointestinal: Negative for abdominal pain, constipation, diarrhea, nausea and vomiting.   Genitourinary: Negative for dysuria, frequency and urgency.   Musculoskeletal: Negative for neck pain.   Skin: Negative for rash.   Neurological: Negative for dizziness, weakness and headaches.   Psychiatric/Behavioral:        In the past two weeks the pt has not felt down, depressed, hopeless or lost interest in doing things   All other systems reviewed and are negative.      Objective   Physical Exam   Constitutional: He is oriented to person, place, and " time. He appears well-developed and well-nourished. He is cooperative.  Non-toxic appearance. No distress.   HENT:   Head: Normocephalic and atraumatic.   Right Ear: External ear normal.   Left Ear: External ear normal.   Nose: Nose normal.   Mouth/Throat: Uvula is midline, oropharynx is clear and moist and mucous membranes are normal. No oral lesions (No obvious oral or pharyngeal abnormalities noted).   Eyes: Pupils are equal, round, and reactive to light. Conjunctivae and EOM are normal. Right eye exhibits no discharge. Left eye exhibits no discharge. No scleral icterus.   Neck: Normal range of motion. Neck supple. No thyromegaly present.   Cardiovascular: Normal rate, regular rhythm, normal heart sounds and intact distal pulses. Exam reveals no gallop and no friction rub.   No murmur heard.  Pulmonary/Chest: Effort normal and breath sounds normal. No respiratory distress. He has no wheezes. He has no rales.   Abdominal: Soft. Normal appearance and bowel sounds are normal. He exhibits no distension. There is no tenderness. There is no rebound and no guarding.   Musculoskeletal: Normal range of motion. He exhibits no edema.   Lymphadenopathy:     He has no cervical adenopathy.   Neurological: He is alert and oriented to person, place, and time. No cranial nerve deficit. GCS eye subscore is 4. GCS verbal subscore is 5. GCS motor subscore is 6.   Skin: Skin is warm and dry. No rash noted.   Psychiatric: He has a normal mood and affect. His behavior is normal. Judgment and thought content normal.   Nursing note and vitals reviewed.      Assessment/Plan   Kris was seen today for mouth lesions.    Diagnoses and all orders for this visit:    Mouth sores  -     Lidocaine Viscous HCl (XYLOCAINE) 2 % solution; Please mix with Benadryl and Maalox to make 1:1:1.  Swish and swallow one tsp one hour prior to meals and bedtime.  Please flavor if able    Herpes simplex infection of penis    Encouraged with low-acidic diet, no  corin or juices.  At least 75 oz water daily.  May rinse with half-strength peroxide.  Good mouth care.

## 2020-08-12 DIAGNOSIS — A60.01 HERPES SIMPLEX INFECTION OF PENIS: ICD-10-CM

## 2020-08-12 DIAGNOSIS — K13.79 MOUTH SORES: ICD-10-CM

## 2020-08-12 RX ORDER — ACYCLOVIR 400 MG/1
TABLET ORAL
Qty: 60 TABLET | Refills: 5 | Status: SHIPPED | OUTPATIENT
Start: 2020-08-12 | End: 2021-02-08 | Stop reason: SDUPTHER

## 2020-08-12 RX ORDER — LIDOCAINE HYDROCHLORIDE 20 MG/ML
SOLUTION OROPHARYNGEAL
Qty: 145 ML | Refills: 0 | Status: SHIPPED | OUTPATIENT
Start: 2020-08-12 | End: 2021-02-26 | Stop reason: SDUPTHER

## 2021-02-08 DIAGNOSIS — A60.01 HERPES SIMPLEX INFECTION OF PENIS: ICD-10-CM

## 2021-02-09 RX ORDER — ACYCLOVIR 400 MG/1
TABLET ORAL
Qty: 30 TABLET | Refills: 5 | Status: SHIPPED | OUTPATIENT
Start: 2021-02-09

## 2021-02-26 DIAGNOSIS — K13.79 MOUTH SORES: ICD-10-CM

## 2021-03-01 RX ORDER — LIDOCAINE HYDROCHLORIDE 20 MG/ML
SOLUTION OROPHARYNGEAL
Qty: 145 ML | Refills: 0 | Status: SHIPPED | OUTPATIENT
Start: 2021-03-01

## 2024-06-03 NOTE — PROGRESS NOTES
History     Chief Complaint   Patient presents with    Abdominal Pain    Flank Pain     HPI  Reji Monahan is a 38 year old male who presents via EMS for evaluation of an exacerbation of his chronic abdominal pain, nausea, and vomiting.  Unable to keep anything p.o. down for the last 2 days.  He reports that his last insulin dose was yesterday.  Blood sugars yesterday were 257 and 237.  Otherwise, he reports that his blood sugars have been under control.  He does have an extensive history of multiple episodes of DKA.  Patient also has a care plan for his chronic abdominal pain given the recurrent ED and inpatient visits which was reviewed in detail.  He states that his abdominal pain increased over the past 5 days.  Has pain throughout and even in the bilateral flank area.  Nausea and vomiting present.  No diarrhea.  No hematochezia or melena.  He tried to take his home oxycodone today, but states that he was unable to swallow the pill.  Denies any dysuria, frequency, urgency, or gross hematuria.  No skin rashes.  No chest pain or dyspnea.  Denies any recent fall or injury.  IV access was started by EMS.  He was offered Zofran and Toradol by EMS, but he declined.  Patient is requesting immediate pain medication and specifically requests either fentanyl or Dilaudid.    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      Nausea with vomiting 09/17/2021     Priority: Medium    DKA related to DM type 1 12/23/2020     Priority: Medium    MRSA carrier 08/28/2020     Priority: Medium    DM type 1,  Subjective   Kris Lee is a 27 y.o. male. 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  Has no complaints today.  PCP is Concepcion.    History of Present Illness     The following portions of the patient's history were reviewed and updated as appropriate: allergies, current medications, past family history, past medical history, past social history, past surgical history and problem list.    Review of Systems   Constitutional: Negative for chills, fatigue and fever.   HENT: Negative for congestion, sneezing, sore throat and trouble swallowing.    Eyes: Negative for visual disturbance.   Respiratory: Negative for cough, chest tightness, shortness of breath and wheezing.    Cardiovascular: Negative for chest pain, palpitations and leg swelling.   Gastrointestinal: Negative for abdominal pain, constipation, diarrhea, nausea and vomiting.   Genitourinary: Negative for dysuria, frequency and urgency.   Musculoskeletal: Negative for neck pain.   Skin: Negative for rash.   Neurological: Negative for dizziness, weakness and headaches.   Psychiatric/Behavioral:        In the past two weeks the pt has not felt down, depressed, hopeless or lost interest in doing things   All other systems reviewed and are negative.      Objective   Physical Exam   Constitutional: He is oriented to person, place, and time. He appears well-developed and well-nourished. He is cooperative.  Non-toxic appearance. He does not have a sickly appearance. He does not appear ill. No distress.   HENT:   Head: Normocephalic and atraumatic.   Right Ear: External ear normal.   Left Ear: External ear normal.   Nose: Nose normal.   Mouth/Throat: Uvula is midline, oropharynx is clear and moist and mucous membranes are normal.   Eyes: Conjunctivae, EOM and lids are normal. Pupils are equal, round, and reactive to light. Right eye exhibits no discharge. Left eye exhibits no discharge. No scleral  Hgb A1C 11.9 on 9/8/21 08/04/2020     Priority: Medium    ADD (attention deficit disorder) 04/06/2020     Priority: Medium    Gastroesophageal reflux disease without esophagitis 04/06/2020     Priority: Medium    Cannabinoid hyperemesis syndrome 03/24/2020     Priority: Medium    Chest pain 01/26/2020     Priority: Medium    Splenomegaly 01/22/2020     Priority: Medium    Moderate malnutrition (H24) 01/20/2018     Priority: Medium    Cognitive impairment 01/19/2018     Priority: Medium    Paroxysmal SVT (supraventricular tachycardia) (H24) 11/12/2017     Priority: Medium     Last Assessment & Plan:   Formatting of this note might be different from the original.  Started on diltiazem on d/c from [facility]. Tachycardic today: 117. He reports compliance with this medication. Declined ablation while seen by [facility] at [place] but will be seen as an outpatient- prefers to be seen at [facility] scheduled.  Last Assessment & Plan:   Started on diltiazem on d/c from [facility]. Tachycardic today: 117. He reports compliance with this medication. Declined ablation while seen by [facility] at [place] but will be seen as an outpatient- prefers to be seen at [facility] scheduled.      Phimosis 11/01/2017     Priority: Medium     Formatting of this note might be different from the original.  Penis entrapped with recurrent balanitis      Balanitis 12/04/2016     Priority: Medium     Last Assessment & Plan:   Formatting of this note might be different from the original.  Reji is here today requesting additional antibiotics as he continues to have penile pain and phimosis. He was [facility] [place] [facility], left upset and went to [facility] and was treated with percocet, lotrimin, and levaquin. Per care everywhere, he then went to [facility] 9/12 and was seen by urology:  Urology was consulted for balanitis and phimosis, recommended a 10 day course of augmentin, 14 day course of fluconazole, and Lotrisone  icterus.   Neck: Trachea normal, normal range of motion and phonation normal. Neck supple. No thyromegaly present.   Cardiovascular: Normal rate, regular rhythm, normal heart sounds and intact distal pulses.  Exam reveals no gallop and no friction rub.    No murmur heard.  Pulmonary/Chest: Effort normal and breath sounds normal. No respiratory distress. He has no wheezes. He has no rales.   Abdominal: Soft. Normal appearance and bowel sounds are normal. He exhibits no distension. There is no tenderness. There is no rebound and no guarding.   Musculoskeletal: Normal range of motion. He exhibits no edema.       Vascular Status -  His exam exhibits right foot vasculature normal. His exam exhibits no right foot edema. His exam exhibits left foot vasculature normal. His exam exhibits no left foot edema.  Lymphadenopathy:     He has no cervical adenopathy.   Neurological: He is alert and oriented to person, place, and time. No cranial nerve deficit. GCS eye subscore is 4. GCS verbal subscore is 5. GCS motor subscore is 6.   Skin: Skin is warm, dry and intact. No rash noted.   Psychiatric: He has a normal mood and affect. His speech is normal and behavior is normal. Judgment and thought content normal. Cognition and memory are normal.   Nursing note and vitals reviewed.      Assessment/Plan   Kris was seen today for annual exam.    Diagnoses and all orders for this visit:    Encounter for wellness examination in adult    Sports physical    Form filled out and copy made for this facility to keep.  Please see form for additional information.            cream BID to help with symtpoms. Urology recommended to follow up in clinic in 1 month to reassess phimosis and reevaluate need for circumcision.    Reji is a poor historian, but it sounds like he took the meds given by [facility] and not others. Discussed with PharmD in our clinic. Will give doxycycline, fluconazone and lotrisone cream as recommended by urology (14 day course). Also referring to urology. Giving ibuprofen for pain. Recommended that he return next week to see his PCP.      Homelessness 06/09/2016     Priority: Medium     Last Assessment & Plan:   Formatting of this note might be different from the original.  Per [place] notes, was living in a hotel, though patient tells me today that he lives in a house in [place]- living with his brother and caring for him.  Last Assessment & Plan:   Per [place] notes, was living in a hotel, though patient tells me today that he lives in a house in [place]- living with his brother and caring for him.      Diabetic gastroparesis (H) 05/06/2016     Priority: Medium    Diabetic neuropathy (H) 05/06/2016     Priority: Medium    ACP (advance care planning) 01/08/2016     Priority: Medium     Patient has identified Health Care Agent(s): No  Add Health Care Agents: No  Patient has Advance Care Plan Documents (Health Care Directive, POLST): No, Pt declined.    Patient has identified Specific Treatment Preferences: Yes   Specific Treatment Preferences: a.) Code Status:  CPR/Attempt Resuscitation      SW met with Pt 1/7/2106 - Pt states if he's unable to communicate his healthcare wishes his brother, Raj Monahan - C: [phone], would be his primary spokesperson. LEXY Barreto, [facility]...Pager [phone]... ext. [phone], contact via anwpaging      Asthma 01/06/2016     Priority: Medium    Cannabis abuse 12/16/2015     Priority: Medium    Poor dentition 11/04/2015     Priority: Medium    Dental caries 08/12/2015     Priority: Medium    Paranoid  schizophrenia (H) 11/25/2014     Priority: Medium    Mild intermittent asthma 02/13/2014     Priority: Medium    Charcot foot due to diabetes mellitus (H) 11/22/2013     Priority: Medium    Borderline intellectual functioning 09/27/2010     Priority: Medium    Dyslipidemia 08/30/2010     Priority: Medium     Formatting of this note might be different from the original.  Last LDL- 105 on 6/11/13  Last LDL- 105 on 6/11/13      Asperger's disorder 08/05/2009     Priority: Medium    Insomnia 06/30/2009     Priority: Medium        Past Medical History:    Past Medical History:   Diagnosis Date    Cannabis abuse     DKA (diabetic ketoacidoses)     Endocarditis 04/2021    Homeless     Schizophrenia (H)     Sepsis, due to unspecified organism, unspecified whether acute organ dysfunction present (H) 02/06/2021    Type 1 diabetes mellitus (H)     Uncomplicated asthma     Urinary tract infection without hematuria, site unspecified 02/06/2021       Past Surgical History:    No past surgical history on file.    Family History:    Family History   Problem Relation Age of Onset    Cerebrovascular Disease Father     Diabetes Brother        Social History:  Marital Status:  Single [1]  Social History     Tobacco Use    Smoking status: Never   Substance Use Topics    Alcohol use: No    Drug use: Yes     Types: Marijuana        Medications:    B-D U/F 31G X 8 MM insulin pen needle  bisacodyl (DULCOLAX) 5 MG EC tablet  dorzolamide-timolol (COSOPT) 2-0.5 % ophthalmic solution   MG capsule  DULoxetine (CYMBALTA) 30 MG capsule  famotidine (PEPCID) 20 MG tablet  insulin aspart (NOVOLOG PEN) 100 UNIT/ML pen  insulin glargine (LANTUS PEN) 100 UNIT/ML pen  metoclopramide (REGLAN) 10 MG tablet  OLANZapine (ZYPREXA) 10 MG tablet  ondansetron (ZOFRAN ODT) 4 MG ODT tab  oxyCODONE (ROXICODONE) 5 MG tablet  Polyethylene Glycol 400 (VISINE DRY EYE RELIEF) 1 % SOLN  risperiDONE (RISPERDAL) 2 MG tablet  senna (SENOKOT) 8.6 MG  "tablet          Review of Systems   All other systems reviewed and are negative.      Physical Exam   BP: (!) 131/101  Pulse: 111  Temp: 97.9  F (36.6  C)  Resp: 30  Height: 185.4 cm (6' 1\")  Weight: 81.6 kg (180 lb)  SpO2: 100 %      Physical Exam  Vitals and nursing note reviewed.   Constitutional:       General: He is not in acute distress.     Appearance: He is not ill-appearing, toxic-appearing or diaphoretic.      Comments: Strong smell of ketones when I enter the room.   HENT:      Head: Normocephalic and atraumatic.      Right Ear: External ear normal.      Left Ear: External ear normal.      Nose: Nose normal.      Mouth/Throat:      Pharynx: No pharyngeal swelling or oropharyngeal exudate.      Comments: Dry  Eyes:      General: No scleral icterus.        Right eye: No discharge.         Left eye: No discharge.      Extraocular Movements: Extraocular movements intact.      Conjunctiva/sclera: Conjunctivae normal.      Pupils: Pupils are equal, round, and reactive to light.   Neck:      Thyroid: No thyromegaly.   Cardiovascular:      Rate and Rhythm: Normal rate and regular rhythm.      Heart sounds: Normal heart sounds. No murmur heard.  Pulmonary:      Effort: Pulmonary effort is normal. No respiratory distress.      Breath sounds: Normal breath sounds. No wheezing or rales.   Chest:      Chest wall: No tenderness.   Abdominal:      General: Bowel sounds are normal. There is no distension.      Palpations: Abdomen is soft. There is no mass.      Tenderness: There is generalized abdominal tenderness. There is right CVA tenderness, left CVA tenderness and guarding. There is no rebound.      Hernia: No hernia is present.   Musculoskeletal:         General: No tenderness or deformity. Normal range of motion.      Cervical back: Normal range of motion and neck supple.   Lymphadenopathy:      Cervical: No cervical adenopathy.   Skin:     General: Skin is warm and dry.      Capillary Refill: Capillary refill " takes less than 2 seconds.      Findings: No erythema or rash.   Neurological:      Mental Status: He is alert and oriented to person, place, and time.      Cranial Nerves: No cranial nerve deficit.   Psychiatric:         Behavior: Behavior normal.         Thought Content: Thought content normal.         ED Course        Procedures              Critical Care time:  none               Results for orders placed or performed during the hospital encounter of 06/03/24 (from the past 24 hour(s))   CBC with platelets differential    Narrative    The following orders were created for panel order CBC with platelets differential.  Procedure                               Abnormality         Status                     ---------                               -----------         ------                     CBC with platelets and d...[[id number]]                      Final result                 Please view results for these tests on the individual orders.   Comprehensive metabolic panel   Result Value Ref Range    Sodium 127 (L) 135 - 145 mmol/L    Potassium 4.2 3.4 - 5.3 mmol/L    Carbon Dioxide (CO2) 14 (L) 22 - 29 mmol/L    Anion Gap 25 (H) 7 - 15 mmol/L    Urea Nitrogen 18.3 6.0 - 20.0 mg/dL    Creatinine 0.77 0.67 - 1.17 mg/dL    GFR Estimate >90 >60 mL/min/1.73m2    Calcium 9.2 8.6 - 10.0 mg/dL    Chloride 88 (L) 98 - 107 mmol/L    Glucose 270 (H) 70 - 99 mg/dL    Alkaline Phosphatase 124 40 - 150 U/L    AST 28 0 - 45 U/L    ALT 29 0 - 70 U/L    Protein Total 7.0 6.4 - 8.3 g/dL    Albumin 4.3 3.5 - 5.2 g/dL    Bilirubin Total 1.1 <=1.2 mg/dL   Lipase   Result Value Ref Range    Lipase 12 (L) 13 - 60 U/L   CRP inflammation   Result Value Ref Range    CRP Inflammation 4.31 <5.00 mg/L   Blood gas venous   Result Value Ref Range    pH Venous 7.31 (L) 7.32 - 7.43    pCO2 Venous 31 (L) 40 - 50 mm Hg    pO2 Venous 33 25 - 47 mm Hg    Bicarbonate Venous 16 (L) 21 - 28 mmol/L    Base Excess/Deficit Venous -9.4 (L) -3.0 - 3.0 mmol/L     FIO2 21     Oxyhemoglobin Venous 60 (L) 70 - 75 %    O2 Sat, Venous 61.4 (L) 70.0 - 75.0 %    Narrative    In healthy individuals, oxyhemoglobin (O2Hb) and oxygen saturation (SO2) are approximately equal. In the presence of dyshemoglobins, oxyhemoglobin can be considerably lower than oxygen saturation.   Ketone Beta-Hydroxybutyrate Quantitative,Rapid   Result Value Ref Range    Ketone (Beta-Hydroxybutyrate) Quantitative 8.00 (HH) <=0.30 mmol/L   Lactic acid whole blood with 1x repeat in 2 hr when >2   Result Value Ref Range    Lactic Acid, Initial 1.5 0.7 - 2.0 mmol/L   CBC with platelets and differential   Result Value Ref Range    WBC Count 8.8 4.0 - 11.0 10e3/uL    RBC Count 4.87 4.40 - 5.90 10e6/uL    Hemoglobin 13.4 13.3 - 17.7 g/dL    Hematocrit 40.3 40.0 - 53.0 %    MCV 83 78 - 100 fL    MCH 27.5 26.5 - 33.0 pg    MCHC 33.3 31.5 - 36.5 g/dL    RDW 13.3 10.0 - 15.0 %    Platelet Count 196 150 - 450 10e3/uL    % Neutrophils 76 %    % Lymphocytes 16 %    % Monocytes 7 %    % Eosinophils 0 %    % Basophils 0 %    % Immature Granulocytes 1 %    NRBCs per 100 WBC 0 <1 /100    Absolute Neutrophils 6.7 1.6 - 8.3 10e3/uL    Absolute Lymphocytes 1.4 0.8 - 5.3 10e3/uL    Absolute Monocytes 0.6 0.0 - 1.3 10e3/uL    Absolute Eosinophils 0.0 0.0 - 0.7 10e3/uL    Absolute Basophils 0.0 0.0 - 0.2 10e3/uL    Absolute Immature Granulocytes 0.1 <=0.4 10e3/uL    Absolute NRBCs 0.0 10e3/uL   Hemoglobin A1c   Result Value Ref Range    Hemoglobin A1C 8.3 (H) <5.7 %   UA with Microscopic reflex to Culture    Specimen: Urine, NOS   Result Value Ref Range    Color Urine Yellow Colorless, Straw, Light Yellow, Yellow    Appearance Urine Clear Clear    Glucose Urine >499 (A) Negative mg/dL    Bilirubin Urine Negative Negative    Ketones Urine 80 (A) Negative mg/dL    Specific Gravity Urine 1.023 1.003 - 1.035    Blood Urine Negative Negative    pH Urine 5.0 5.0 - 7.0    Protein Albumin Urine Negative Negative mg/dL    Urobilinogen Urine  Normal Normal, 2.0 mg/dL    Nitrite Urine Negative Negative    Leukocyte Esterase Urine Trace (A) Negative    Mucus Urine Present (A) None Seen /LPF    RBC Urine 1 <=2 /HPF    WBC Urine 5 <=5 /HPF    Hyaline Casts Urine 1 <=2 /LPF    Narrative    Urine Culture not indicated   Urine Drug Screen    Narrative    The following orders were created for panel order Urine Drug Screen.  Procedure                               Abnormality         Status                     ---------                               -----------         ------                     Urine Drug Screen Panel[[id number]]      Abnormal            Final result                 Please view results for these tests on the individual orders.   Urine Drug Screen Panel   Result Value Ref Range    Amphetamines Urine Screen Negative Screen Negative    Barbituates Urine Screen Negative Screen Negative    Benzodiazepine Urine Screen Negative Screen Negative    Cannabinoids Urine Screen Positive (A) Screen Negative    Cocaine Urine Screen Negative Screen Negative    Fentanyl Qual Urine Screen Negative Screen Negative    Opiates Urine Screen Negative Screen Negative    PCP Urine Screen Negative Screen Negative   Glucose by meter   Result Value Ref Range    GLUCOSE BY METER POCT 264 (H) 70 - 99 mg/dL       Medications   ondansetron (ZOFRAN) injection 4 mg (4 mg Intravenous $Given 6/3/24 1442)   dextrose 5% and 0.45% NaCl infusion (has no administration in time range)   dextrose 50 % injection 25-50 mL (has no administration in time range)   insulin regular (MYXREDLIN) 1 unit/mL infusion (3 Units/hr Intravenous $New Bag 6/3/24 1617)   glucose gel 15-30 g (has no administration in time range)     Or   dextrose 50 % injection 25-50 mL (has no administration in time range)     Or   glucagon injection 1 mg (has no administration in time range)   sodium chloride 0.9% BOLUS 1,000 mL (0 mLs Intravenous Stopped 6/3/24 1555)       Assessments & Plan (with Medical Decision  Making)     DKA (diabetic ketoacidosis) (H)  Chronic abdominal pain     38 year old male type I diabetic with chronic abdominal pain on a care plan who presents for evaluation of symptoms increasing over the past 5 days.  Abdominal pain throughout his abdomen and also has pain in the bilateral flank area.  Nausea and vomiting for the past 2 days with unable to keep anything down p.o.  Last insulin dose was yesterday.  Blood sugars were 237 and 257 yesterday.  See HPI above for details.  I reviewed his care plan in detail prior to seeing the patient.  Recommendation is for no IV narcotic therapy or any controlled substances based on the care plan.  When I initially saw the patient, he was specifically requesting IV fentanyl or IV Dilaudid for management of his pain.  Exam with blood pressure 131/101, temperature 97.9, pulse 111, respiration 30, oxygen saturation 100% on room air.  Strong smell of ketones when I enter the room.Patient is not in acute distress but he is holding his abdomen.  He has generalized tenderness with guarding but no rebound.  Positive bilateral CVA tenderness.  No hernia.  No hepatosplenomegaly.ENT exam with dry oral mucous membranes, but no other abnormalities.  No infectious etiology identified.  Cardiopulmonary exam normal.  No adventitious lung sounds noted.  Skin without rash.  No lower extremity edema.    IV was established.  He was given 2 L of IV normal saline for rehydration purposes.  Was also given IV Zofran.  Patient was requesting IV narcotic therapy, but based on his care plan I did not feel comfortable proceeding with this.  I offered him IV Zyprexa, IV Zofran, or other antinausea medication to treat his symptoms initially.  He declined.  Laboratory levels returned with an elevated ketone level of 8.0.  Acidosis noted with pH of 7.31, pCO2 31, and venous bicarb of 16.  There is an anion gap of 25.  Hyponatremia down to 127.  Potassium normal at 4.2.  Blood sugar 270.  LFTs  normal.  Lipase and CRP normal.  Lactic acid level normal at 1.5 and CBC without leukocytosis.  Hemoglobin A1c elevated at 8.3%.  Urinalysis with no leukocyte esterase and no nitrite.  Urine tox screen positive for cannabinoids but otherwise negative.  Patient appears to have an acute exacerbation of his chronic abdominal pain in the setting of DKA based on laboratory workup.  I did speak with the inpatient hospitalist, Brittney Moreland NP, who kindly agreed to accept the care of this patient in the ICU.  Dr. Pineda, ED MD, was involved with this patient's care as well.       I have reviewed the nursing notes.    I have reviewed the findings, diagnosis, plan and need for follow up with the patient.           Medical Decision Making  The patient's presentation was of high complexity (an acute health issue posing potential threat to life or bodily function).    The patient's evaluation involved:  review of external note(s) from 1 sources (see separate area of note for details)  ordering and/or review of 3+ test(s) in this encounter (see separate area of note for details)    The patient's management necessitated high risk (drug therapy requiring intensive monitoring (see separate area of note for details)) and high risk (a decision regarding hospitalization).        New Prescriptions    No medications on file       Final diagnoses:   DKA (diabetic ketoacidosis) (H)   Chronic abdominal pain     Disclaimer: This note consists of symbols derived from keyboarding, dictation and/or voice recognition software. As a result, there may be errors in the script that have gone undetected. Please consider this when interpreting information found in this chart.      6/3/2024   [facility] EMERGENCY DEPT       Lionel Myers PA-C  06/03/24 4034